# Patient Record
Sex: MALE | Race: WHITE | NOT HISPANIC OR LATINO | Employment: OTHER | URBAN - NONMETROPOLITAN AREA
[De-identification: names, ages, dates, MRNs, and addresses within clinical notes are randomized per-mention and may not be internally consistent; named-entity substitution may affect disease eponyms.]

---

## 2017-10-11 ENCOUNTER — TRANSFERRED RECORDS (OUTPATIENT)
Dept: HEALTH INFORMATION MANAGEMENT | Facility: HOSPITAL | Age: 77
End: 2017-10-11

## 2017-10-12 ENCOUNTER — HOSPITAL ENCOUNTER (OUTPATIENT)
Dept: CT IMAGING | Facility: HOSPITAL | Age: 77
Discharge: HOME | End: 2017-10-12
Attending: FAMILY MEDICINE | Admitting: FAMILY MEDICINE
Payer: COMMERCIAL

## 2017-10-12 DIAGNOSIS — R10.9 ABDOMINAL PAIN: ICD-10-CM

## 2017-10-12 DIAGNOSIS — R31.9 HEMATURIA: ICD-10-CM

## 2017-10-12 PROCEDURE — 74178 CT ABD&PLV WO CNTR FLWD CNTR: CPT | Mod: TC

## 2017-10-12 PROCEDURE — 25000128 H RX IP 250 OP 636: Performed by: RADIOLOGY

## 2017-10-12 RX ORDER — IOPAMIDOL 612 MG/ML
150 INJECTION, SOLUTION INTRAVASCULAR ONCE
Status: COMPLETED | OUTPATIENT
Start: 2017-10-12 | End: 2017-10-12

## 2017-10-12 RX ADMIN — IOPAMIDOL 130 ML: 612 INJECTION, SOLUTION INTRAVENOUS at 14:29

## 2019-05-09 ENCOUNTER — HOSPITAL ENCOUNTER (EMERGENCY)
Facility: HOSPITAL | Age: 79
Discharge: HOME | End: 2019-05-09
Attending: EMERGENCY MEDICINE | Admitting: EMERGENCY MEDICINE
Payer: COMMERCIAL

## 2019-05-09 VITALS
WEIGHT: 145 LBS | HEIGHT: 69 IN | DIASTOLIC BLOOD PRESSURE: 84 MMHG | OXYGEN SATURATION: 98 % | BODY MASS INDEX: 21.48 KG/M2 | HEART RATE: 135 BPM | TEMPERATURE: 97.4 F | RESPIRATION RATE: 16 BRPM | SYSTOLIC BLOOD PRESSURE: 113 MMHG

## 2019-05-09 DIAGNOSIS — I48.91 ATRIAL FIBRILLATION WITH RVR (H): Primary | ICD-10-CM

## 2019-05-09 LAB
ALBUMIN SERPL-MCNC: 3.9 G/DL (ref 3.4–5)
ALP SERPL-CCNC: 75 U/L (ref 40–150)
ALT SERPL W P-5'-P-CCNC: 30 U/L (ref 0–70)
ANION GAP SERPL CALCULATED.3IONS-SCNC: 6 MMOL/L (ref 3–14)
AST SERPL W P-5'-P-CCNC: 36 U/L (ref 0–45)
BASOPHILS # BLD AUTO: 0 10E9/L (ref 0–0.2)
BASOPHILS NFR BLD AUTO: 0.5 %
BILIRUB SERPL-MCNC: 0.8 MG/DL (ref 0.2–1.3)
BUN SERPL-MCNC: 21 MG/DL (ref 7–30)
CALCIUM SERPL-MCNC: 9.2 MG/DL (ref 8.5–10.1)
CHLORIDE SERPL-SCNC: 108 MMOL/L (ref 94–109)
CO2 SERPL-SCNC: 27 MMOL/L (ref 20–32)
CREAT SERPL-MCNC: 0.74 MG/DL (ref 0.66–1.25)
D DIMER PPP DDU-MCNC: 210 NG/ML D-DU (ref 0–300)
DIFFERENTIAL METHOD BLD: NORMAL
EOSINOPHIL # BLD AUTO: 0.1 10E9/L (ref 0–0.7)
EOSINOPHIL NFR BLD AUTO: 1.8 %
ERYTHROCYTE [DISTWIDTH] IN BLOOD BY AUTOMATED COUNT: 14.8 % (ref 10–15)
GFR SERPL CREATININE-BSD FRML MDRD: 89 ML/MIN/{1.73_M2}
GLUCOSE SERPL-MCNC: 94 MG/DL (ref 70–99)
HCT VFR BLD AUTO: 46.1 % (ref 40–53)
HGB BLD-MCNC: 15.5 G/DL (ref 13.3–17.7)
IMM GRANULOCYTES # BLD: 0 10E9/L (ref 0–0.4)
IMM GRANULOCYTES NFR BLD: 0.4 %
LIPASE SERPL-CCNC: 105 U/L (ref 73–393)
LYMPHOCYTES # BLD AUTO: 1.3 10E9/L (ref 0.8–5.3)
LYMPHOCYTES NFR BLD AUTO: 22.3 %
MAGNESIUM SERPL-MCNC: 2.4 MG/DL (ref 1.6–2.3)
MCH RBC QN AUTO: 30.2 PG (ref 26.5–33)
MCHC RBC AUTO-ENTMCNC: 33.6 G/DL (ref 31.5–36.5)
MCV RBC AUTO: 90 FL (ref 78–100)
MONOCYTES # BLD AUTO: 0.7 10E9/L (ref 0–1.3)
MONOCYTES NFR BLD AUTO: 13 %
NEUTROPHILS # BLD AUTO: 3.5 10E9/L (ref 1.6–8.3)
NEUTROPHILS NFR BLD AUTO: 62 %
NRBC # BLD AUTO: 0 10*3/UL
NRBC BLD AUTO-RTO: 0 /100
PLATELET # BLD AUTO: 227 10E9/L (ref 150–450)
POTASSIUM SERPL-SCNC: 4.3 MMOL/L (ref 3.4–5.3)
PROT SERPL-MCNC: 7.7 G/DL (ref 6.8–8.8)
RBC # BLD AUTO: 5.14 10E12/L (ref 4.4–5.9)
SODIUM SERPL-SCNC: 141 MMOL/L (ref 133–144)
TROPONIN I SERPL-MCNC: <0.015 UG/L (ref 0–0.04)
WBC # BLD AUTO: 5.7 10E9/L (ref 4–11)

## 2019-05-09 PROCEDURE — 99285 EMERGENCY DEPT VISIT HI MDM: CPT | Mod: Z6 | Performed by: EMERGENCY MEDICINE

## 2019-05-09 PROCEDURE — 83690 ASSAY OF LIPASE: CPT | Performed by: EMERGENCY MEDICINE

## 2019-05-09 PROCEDURE — 99285 EMERGENCY DEPT VISIT HI MDM: CPT | Mod: 25

## 2019-05-09 PROCEDURE — 93005 ELECTROCARDIOGRAM TRACING: CPT

## 2019-05-09 PROCEDURE — 80053 COMPREHEN METABOLIC PANEL: CPT | Performed by: EMERGENCY MEDICINE

## 2019-05-09 PROCEDURE — 25000132 ZZH RX MED GY IP 250 OP 250 PS 637: Performed by: EMERGENCY MEDICINE

## 2019-05-09 PROCEDURE — 96361 HYDRATE IV INFUSION ADD-ON: CPT

## 2019-05-09 PROCEDURE — 83735 ASSAY OF MAGNESIUM: CPT | Performed by: EMERGENCY MEDICINE

## 2019-05-09 PROCEDURE — 25000128 H RX IP 250 OP 636: Performed by: EMERGENCY MEDICINE

## 2019-05-09 PROCEDURE — 85025 COMPLETE CBC W/AUTO DIFF WBC: CPT | Performed by: EMERGENCY MEDICINE

## 2019-05-09 PROCEDURE — 96374 THER/PROPH/DIAG INJ IV PUSH: CPT

## 2019-05-09 PROCEDURE — 84484 ASSAY OF TROPONIN QUANT: CPT | Performed by: EMERGENCY MEDICINE

## 2019-05-09 PROCEDURE — 85379 FIBRIN DEGRADATION QUANT: CPT | Performed by: EMERGENCY MEDICINE

## 2019-05-09 PROCEDURE — 36415 COLL VENOUS BLD VENIPUNCTURE: CPT | Performed by: EMERGENCY MEDICINE

## 2019-05-09 RX ORDER — SODIUM CHLORIDE 9 MG/ML
1000 INJECTION, SOLUTION INTRAVENOUS CONTINUOUS
Status: DISCONTINUED | OUTPATIENT
Start: 2019-05-09 | End: 2019-05-10 | Stop reason: HOSPADM

## 2019-05-09 RX ORDER — LABETALOL 20 MG/4 ML (5 MG/ML) INTRAVENOUS SYRINGE
20 ONCE
Status: COMPLETED | OUTPATIENT
Start: 2019-05-09 | End: 2019-05-09

## 2019-05-09 RX ORDER — DILTIAZEM HYDROCHLORIDE 60 MG/1
60 CAPSULE, EXTENDED RELEASE ORAL 2 TIMES DAILY
Qty: 60 CAPSULE | Refills: 0 | Status: ON HOLD | OUTPATIENT
Start: 2019-05-09 | End: 2024-09-24

## 2019-05-09 RX ORDER — ASPIRIN 81 MG/1
324 TABLET, CHEWABLE ORAL ONCE
Status: COMPLETED | OUTPATIENT
Start: 2019-05-09 | End: 2019-05-09

## 2019-05-09 RX ADMIN — LABETALOL 20 MG/4 ML (5 MG/ML) INTRAVENOUS SYRINGE 20 MG: at 21:46

## 2019-05-09 RX ADMIN — SODIUM CHLORIDE 1000 ML: 9 INJECTION, SOLUTION INTRAVENOUS at 21:44

## 2019-05-09 RX ADMIN — ASPIRIN 81 MG 324 MG: 81 TABLET ORAL at 21:45

## 2019-05-09 ASSESSMENT — MIFFLIN-ST. JEOR: SCORE: 1378.1

## 2019-05-09 ASSESSMENT — ENCOUNTER SYMPTOMS
ABDOMINAL PAIN: 0
FEVER: 0
SHORTNESS OF BREATH: 0

## 2019-05-09 NOTE — ED AVS SNAPSHOT
[facility]  [address] [place] [id number]  Phone:  [phone]                                    Kem Abdullahi   MRN: [id number]    Department:  [facility]   Date of Visit:  5/9/2019           After Visit Summary Signature Page    I have received my discharge instructions, and my questions have been answered. I have discussed any challenges I see with this plan with the nurse or doctor.    ..........................................................................................................................................  Patient/Patient Representative Signature      ..........................................................................................................................................  Patient Representative Print Name and Relationship to Patient    ..................................................               ................................................  Date                                   Time    ..........................................................................................................................................  Reviewed by Signature/Title    ...................................................              ..............................................  Date                                               Time          22EPIC Rev 08/18

## 2019-05-10 NOTE — ED NOTES
"Pt states today around 1600 he was having \"heartburn\" and decided to check his BP. States his BP machine \"was all over the place\" so he went to hisdaughters and she noted he had an irregular pulse so brought pt to ER. Pt denies any pain at this time stating \"I burped and the heartburn went away\". Denies any SOB or dizziness.  "

## 2019-05-10 NOTE — ED PROVIDER NOTES
History     Chief Complaint   Patient presents with     Heartburn     One episode at home with C/O irregular heart beat     HPI  Kem Abdullahi is a 78 year old male who presented to the emergency department accompanied by the daughter.  He started having an irregular heartbeat since 4 PM today.  He also developed a heartburn that has since cleared after burping.  He had a couple of stents placed more than 10 years ago and is currently not taking any medications apart from multivitamins.  He denies any shortness of breath, dizziness, chest pain, vomiting, orthopnea, paroxysmal dyspnea.  Patient blood pressure is noted to be elevated upon arrival at the ED at 169/121 mmHg.    Allergies:  No Known Allergies    Problem List:    There are no active problems to display for this patient.       Past Medical History:    Past Medical History:   Diagnosis Date     Coronary artery disease      ED (erectile dysfunction)      Hyperlipidemia      Hypertension      Stented coronary artery 2006       Past Surgical History:    Past Surgical History:   Procedure Laterality Date     BACK SURGERY  2009    lower back     COLONOSCOPY       HERNIA REPAIR       PHACOEMULSIFICATION WITH STANDARD INTRAOCULAR LENS IMPLANT Left 5/23/2016    Procedure: PHACOEMULSIFICATION WITH STANDARD INTRAOCULAR LENS IMPLANT;  Surgeon: Bi Wayne MD;  Location: [facility]       Family History:    No family history on file.    Social History:  Marital Status:  Single [1]  Social History     Tobacco Use     Smoking status: Former Smoker   Substance Use Topics     Alcohol use: Not on file     Drug use: Not on file        Medications:      ASPIRIN PO   diltiazem ER (CARDIZEM SR) 60 MG 12 hr capsule   ezetimibe-simvastatin (VYTORIN) 10-40 MG tablet   rivaroxaban ANTICOAGULANT (XARELTO) 20 MG TABS tablet   Tadalafil (CIALIS PO)   zinc 50 MG TABS         Review of Systems   Constitutional: Negative for fever.   Respiratory: Negative for shortness of breath.   "  Cardiovascular: Negative for chest pain.   Gastrointestinal: Negative for abdominal pain.   All other systems reviewed and are negative.      Physical Exam   BP: (!) 169/121  Pulse: (!) 135  Temp: 97.3  F (36.3  C)  Resp: 16  Height: 175.3 cm (5' 9\")  Weight: 65.8 kg (145 lb)  SpO2: 96 %      Physical Exam   Constitutional: He is oriented to person, place, and time. He appears well-developed and well-nourished. No distress.   HENT:   Head: Normocephalic and atraumatic.   Eyes: Pupils are equal, round, and reactive to light. EOM are normal.   Cardiovascular: Normal heart sounds and intact distal pulses. An irregularly irregular rhythm present. Tachycardia present.   Pulmonary/Chest: Effort normal and breath sounds normal. No stridor. No respiratory distress.   Abdominal: Soft. Bowel sounds are normal. He exhibits no distension. There is no tenderness.   Musculoskeletal: He exhibits no edema, tenderness or deformity.   Neurological: He is alert and oriented to person, place, and time. No cranial nerve deficit.   Skin: He is not diaphoretic.   Nursing note and vitals reviewed.      ED Course   Evaluated and laboratory tests ordered.  Started on IV fluid hydration.  IV Cardizem given for A. fib with RVR.       Procedures  EKG: A. fib with RVR at 121 bpm       Results for orders placed or performed during the hospital encounter of 05/09/19 (from the past 24 hour(s))   CBC with platelets differential   Result Value Ref Range    WBC 5.7 4.0 - 11.0 10e9/L    RBC Count 5.14 4.4 - 5.9 10e12/L    Hemoglobin 15.5 13.3 - 17.7 g/dL    Hematocrit 46.1 40.0 - 53.0 %    MCV 90 78 - 100 fl    MCH 30.2 26.5 - 33.0 pg    MCHC 33.6 31.5 - 36.5 g/dL    RDW 14.8 10.0 - 15.0 %    Platelet Count 227 150 - 450 10e9/L    Diff Method Automated Method     % Neutrophils 62.0 %    % Lymphocytes 22.3 %    % Monocytes 13.0 %    % Eosinophils 1.8 %    % Basophils 0.5 %    % Immature Granulocytes 0.4 %    Nucleated RBCs 0 0 /100    Absolute " Neutrophil 3.5 1.6 - 8.3 10e9/L    Absolute Lymphocytes 1.3 0.8 - 5.3 10e9/L    Absolute Monocytes 0.7 0.0 - 1.3 10e9/L    Absolute Eosinophils 0.1 0.0 - 0.7 10e9/L    Absolute Basophils 0.0 0.0 - 0.2 10e9/L    Abs Immature Granulocytes 0.0 0 - 0.4 10e9/L    Absolute Nucleated RBC 0.0    Troponin I   Result Value Ref Range    Troponin I ES <0.015 0.000 - 0.045 ug/L   Comprehensive metabolic panel   Result Value Ref Range    Sodium 141 133 - 144 mmol/L    Potassium 4.3 3.4 - 5.3 mmol/L    Chloride 108 94 - 109 mmol/L    Carbon Dioxide 27 20 - 32 mmol/L    Anion Gap 6 3 - 14 mmol/L    Glucose 94 70 - 99 mg/dL    Urea Nitrogen 21 7 - 30 mg/dL    Creatinine 0.74 0.66 - 1.25 mg/dL    GFR Estimate 89 >60 mL/min/[1.73_m2]    GFR Estimate If Black >90 >60 mL/min/[1.73_m2]    Calcium 9.2 8.5 - 10.1 mg/dL    Bilirubin Total 0.8 0.2 - 1.3 mg/dL    Albumin 3.9 3.4 - 5.0 g/dL    Protein Total 7.7 6.8 - 8.8 g/dL    Alkaline Phosphatase 75 40 - 150 U/L    ALT 30 0 - 70 U/L    AST 36 0 - 45 U/L   Magnesium   Result Value Ref Range    Magnesium 2.4 (H) 1.6 - 2.3 mg/dL   Lipase   Result Value Ref Range    Lipase 105 73 - 393 U/L   D-Dimer ([facility])   Result Value Ref Range    D-Dimer ng/mL 210 0 - 300 ng/ml D-DU       Medications   0.9% sodium chloride BOLUS (1,000 mLs Intravenous New Bag 5/9/19 2144)     Followed by   sodium chloride 0.9% infusion (has no administration in time range)   aspirin (ASA) chewable tablet 324 mg (324 mg Oral Given 5/9/19 2145)   labetalol (NORMODYNE/TRANDATE) syringe 20 mg (20 mg Intravenous Given 5/9/19 2146)       Assessments & Plan (with Medical Decision Making)   Atrial fibrillation with RVR: Responded well to a single dose of Cardizem 20 mg IV.  Blood pressure normalized on the heart rate reduced from 135 to 90 bpm.  Normal CBC, CMP, troponin, d-dimer, magnesium and lipase.  Patient started on Xarelto and Cardizem.  Advised follow-up with PCP tomorrow or latest Monday.  Echocardiogram was ordered as  outpatient.  Results will be discussed with PCP.  Will discharge home on May return to ED if his condition worsens.  Written and verbal discharge instructions given for new onset atrial fibrillation and its management and clinical course.  These were also discussed with the daughter who accompanied patient to the room.  Discharged home in stable condition.    I have reviewed the nursing notes.    I have reviewed the findings, diagnosis, plan and need for follow up with the patient.       Medication List      Started    diltiazem ER 60 MG 12 hr capsule  Commonly known as:  CARDIZEM SR  60 mg, Oral, 2 TIMES DAILY     rivaroxaban ANTICOAGULANT 20 MG Tabs tablet  Commonly known as:  XARELTO  20 mg, Oral, DAILY WITH SUPPER            Final diagnoses:   Atrial fibrillation with RVR ([facility])       5/9/2019   [facility]     Bartolo Barrios MD  05/09/19 0798

## 2020-06-10 ENCOUNTER — MEDICAL CORRESPONDENCE (OUTPATIENT)
Dept: NUCLEAR MEDICINE | Facility: HOSPITAL | Age: 80
End: 2020-06-10

## 2020-06-17 ENCOUNTER — TELEPHONE (OUTPATIENT)
Dept: NUCLEAR MEDICINE | Facility: HOSPITAL | Age: 80
End: 2020-06-17

## 2020-06-17 NOTE — TELEPHONE ENCOUNTER
Spoke with Kem to see if he is available to move his HIDA exam from 6-23-20 to today at 1230 due to a cancellation.    Patient was able to but unfortunately I am not able to move him into today's spot due to not being NPO.    This is not the patient's fault, as he was not scheduled for today.

## 2020-06-23 ENCOUNTER — HOSPITAL ENCOUNTER (OUTPATIENT)
Dept: NUCLEAR MEDICINE | Facility: HOSPITAL | Age: 80
End: 2020-06-23
Attending: INTERNAL MEDICINE
Payer: COMMERCIAL

## 2020-06-23 DIAGNOSIS — R11.10 VOMITING, UNSPECIFIED: ICD-10-CM

## 2020-06-23 PROCEDURE — 78227 HEPATOBIL SYST IMAGE W/DRUG: CPT | Mod: TC

## 2020-06-23 PROCEDURE — 34300033 ZZH RX 343: Performed by: RADIOLOGY

## 2020-06-23 PROCEDURE — A9537 TC99M MEBROFENIN: HCPCS | Performed by: RADIOLOGY

## 2020-06-23 RX ORDER — KIT FOR THE PREPARATION OF TECHNETIUM TC 99M MEBROFENIN 45 MG/10ML
5.2 INJECTION, POWDER, LYOPHILIZED, FOR SOLUTION INTRAVENOUS ONCE
Status: COMPLETED | OUTPATIENT
Start: 2020-06-23 | End: 2020-06-23

## 2020-06-23 RX ADMIN — MEBROFENIN 5.2 MILLICURIE: 45 INJECTION, POWDER, LYOPHILIZED, FOR SOLUTION INTRAVENOUS at 12:57

## 2020-06-23 NOTE — PROCEDURES
16 oz vanilla boost, 28 grams of fat, was orally administered for Nuclear Medicine HIDA with EF exam.

## 2021-07-12 ENCOUNTER — TELEPHONE (OUTPATIENT)
Dept: CARDIAC REHAB | Facility: HOSPITAL | Age: 81
End: 2021-07-12

## 2021-07-12 NOTE — TELEPHONE ENCOUNTER
Patient was called to schedule Phase II Cardiac Rehab. Message was left for him to call [phone].  Isaura Bautista, RT on 7/12/2021 at 2:19 PM

## 2021-07-12 NOTE — TELEPHONE ENCOUNTER
Patient returned call stating he would like to wait until the 26th for a check-in regarding Phase II cardiac rehab as he has not yet had a staged intervention.   Isaura Bautista RT on 7/12/2021 at 2:37 PM

## 2021-07-30 ENCOUNTER — TELEPHONE (OUTPATIENT)
Dept: CARDIAC REHAB | Facility: HOSPITAL | Age: 81
End: 2021-07-30

## 2021-07-30 NOTE — TELEPHONE ENCOUNTER
Patient was called to set up initial evaluation for Phase II Cardiac Rehab. He states he has yet to have his intervention. He does not have anything scheduled at this time. Patient was given call back phone number to call us after he has had his intervention.   RT Tommy on 7/30/2021 at 10:52 AM

## 2021-08-20 ENCOUNTER — OFFICE VISIT (OUTPATIENT)
Dept: FAMILY MEDICINE | Facility: OTHER | Age: 81
End: 2021-08-20
Attending: NURSE PRACTITIONER
Payer: COMMERCIAL

## 2021-08-20 VITALS
HEART RATE: 56 BPM | DIASTOLIC BLOOD PRESSURE: 94 MMHG | TEMPERATURE: 96.9 F | SYSTOLIC BLOOD PRESSURE: 152 MMHG | RESPIRATION RATE: 12 BRPM | BODY MASS INDEX: 21.42 KG/M2 | HEIGHT: 69 IN | WEIGHT: 144.6 LBS

## 2021-08-20 DIAGNOSIS — R35.0 URINARY FREQUENCY: ICD-10-CM

## 2021-08-20 DIAGNOSIS — R81 GLUCOSE FOUND IN URINE ON EXAMINATION: Primary | ICD-10-CM

## 2021-08-20 LAB
ALBUMIN UR-MCNC: NEGATIVE MG/DL
APPEARANCE UR: CLEAR
BILIRUB UR QL STRIP: NEGATIVE
COLOR UR AUTO: ABNORMAL
GLUCOSE UR STRIP-MCNC: 200 MG/DL
HGB UR QL STRIP: NEGATIVE
KETONES UR STRIP-MCNC: NEGATIVE MG/DL
LEUKOCYTE ESTERASE UR QL STRIP: NEGATIVE
NITRATE UR QL: NEGATIVE
PH UR STRIP: 6.5 [PH] (ref 5–9)
SP GR UR STRIP: 1.01 (ref 1–1.03)
UROBILINOGEN UR STRIP-MCNC: NORMAL MG/DL

## 2021-08-20 PROCEDURE — 99203 OFFICE O/P NEW LOW 30 MIN: CPT | Performed by: NURSE PRACTITIONER

## 2021-08-20 PROCEDURE — 81003 URINALYSIS AUTO W/O SCOPE: CPT | Mod: ZL | Performed by: NURSE PRACTITIONER

## 2021-08-20 PROCEDURE — G0463 HOSPITAL OUTPT CLINIC VISIT: HCPCS

## 2021-08-20 RX ORDER — SULFAMETHOXAZOLE/TRIMETHOPRIM 800-160 MG
1 TABLET ORAL 2 TIMES DAILY
Qty: 14 TABLET | Refills: 0 | Status: CANCELLED | OUTPATIENT
Start: 2021-08-20 | End: 2021-08-27

## 2021-08-20 ASSESSMENT — PAIN SCALES - GENERAL: PAINLEVEL: NO PAIN (0)

## 2021-08-20 ASSESSMENT — MIFFLIN-ST. JEOR: SCORE: 1366.28

## 2021-08-20 NOTE — PROGRESS NOTES
ASSESSMENT/PLAN:    I have reviewed the nursing notes.  I have reviewed the findings, diagnosis, plan and need for follow up with the patient.    1. Urinary frequency  2. Glucose found in urine on examination  Alert is afebrile, normal vital signs.  Physical examination without abnormal findings.  - UA reflex to Microscopic and Culture  -Urine showed negative nitrates, leukocyte esterase, bacteria today.  There was 200 of glucose found on urinalysis.  I discussed this at length with patient and recommended we draw an A1c and BMP today.  He doctors at [facility] in [place], [place] and I am unable to see records from that.  States he is certain he had an A1c done in the last 2 months that was normal.  He declines having A1c and BMP drawn today but does agree to follow-up next week with Dr. Rodriguez (his PCP) to have this further evaluated.  I recommended that he follow up urgently or emergently if he develops any new or worsening symptoms prior to that.   Certainly follow-up sooner if urinary symptoms worsen or if he develops a fever with chills.    Discussed warning signs/symptoms indicative of need to f/u    Follow up if symptoms persist or worsen or concerns    I explained my diagnostic considerations and recommendations to the patient, who voiced understanding and agreement with the treatment plan. All questions were answered. We discussed potential side effects of any prescribed or recommended therapies, as well as expectations for response to treatments.    Paola Vergara NP  8/20/2021  11:10 AM    HPI:  Kem Abdullahi is a 80 year old male who presents to [facility] today for concerns of burning with urination and slow urinary stream. Reports history of kidney stones about 5 years ago, currently no flank pain or symptoms consistent with that. Urinary frequency and urgency.  No hematuria. No fever/chills. Symptoms started about 3 days ago. Symptoms staying about the same in severity. Drinking lots of water. No  "history of urinary tract infection in the past. No nausea, vomiting, diarrhea.  Denies concern for STDs.  Denies penile discharge.      Past Medical History:   Diagnosis Date     Coronary artery disease      ED (erectile dysfunction)      Hyperlipidemia      Hypertension      Stented coronary artery 2006     Past Surgical History:   Procedure Laterality Date     BACK SURGERY  2009    lower back     COLONOSCOPY       HERNIA REPAIR       PHACOEMULSIFICATION WITH STANDARD INTRAOCULAR LENS IMPLANT Left 5/23/2016    Procedure: PHACOEMULSIFICATION WITH STANDARD INTRAOCULAR LENS IMPLANT;  Surgeon: Bi Wayne MD;  Location: [facility]     Social History     Tobacco Use     Smoking status: Former Smoker     Smokeless tobacco: Never Used   Substance Use Topics     Alcohol use: Yes     Comment: occassionally     Current Outpatient Medications   Medication Sig Dispense Refill     ASPIRIN PO Take 650 mg by mouth daily        diltiazem ER (CARDIZEM SR) 60 MG 12 hr capsule Take 1 capsule (60 mg) by mouth 2 times daily 60 capsule 0     ezetimibe-simvastatin (VYTORIN) 10-40 MG tablet Take 1 tablet by mouth At Bedtime        Tadalafil (CIALIS PO) Take 10 mg by mouth daily as needed for erectile dysfunction       zinc 50 MG TABS Take 1 tablet by mouth daily       No Known Allergies  Past medical history, past surgical history, current medications and allergies reviewed and accurate to the best of my knowledge.      ROS:  Refer to HPI    BP (!) 152/94 (BP Location: Left arm, Patient Position: Sitting, Cuff Size: Adult Regular)   Pulse 56   Temp 96.9  F (36.1  C) (Tympanic)   Resp 12   Ht 1.753 m (5' 9\")   Wt 65.6 kg (144 lb 9.6 oz)   BMI 21.35 kg/m      EXAM:  General Appearance: Well appearing 80 year old male, appropriate appearance for age. No acute distress  Respiratory: normal chest wall and respirations.  Normal effort.  Clear to auscultation bilaterally, no wheezing, crackles or rhonchi.  No increased work of breathing.  " No cough appreciated.  Cardiac: RRR with no murmurs  Abdomen: soft, nontender, no rigidity, no rebound tenderness or guarding, normal bowel sounds present  :  No suprapubic tenderness to palpation.  No CVA tenderness to palpation.    Musculoskeletal:  Equal movement of bilateral upper extremities.  Equal movement of bilateral lower extremities.  Normal gait.    Psychological: normal affect, alert, oriented, and pleasant.

## 2021-08-20 NOTE — PATIENT INSTRUCTIONS
We discussed your urine showing no signs of infection today, but it did show 200 of glucose in the urine.   With this being said, we discussed drawing A1C today and kidney function labs. I cannot see your lab work from [facility] (Dr. Rodriguez).    You declined this blood work today, but agree to follow up with Dr. Rodriguez as soon as possible to evaluate this concern soon.     Follow up emergently if you develop any worsening symptoms or concerns.

## 2021-09-24 ENCOUNTER — HOSPITAL ENCOUNTER (OUTPATIENT)
Dept: CARDIAC REHAB | Facility: HOSPITAL | Age: 81
Setting detail: THERAPIES SERIES
End: 2021-09-24
Attending: INTERNAL MEDICINE
Payer: COMMERCIAL

## 2021-09-24 VITALS — BODY MASS INDEX: 21.33 KG/M2 | WEIGHT: 144 LBS | HEIGHT: 69 IN

## 2021-09-24 DIAGNOSIS — Z98.61 POSTSURGICAL PERCUTANEOUS TRANSLUMINAL CORONARY ANGIOPLASTY STATUS: ICD-10-CM

## 2021-09-24 PROCEDURE — 999N000109 HC STATISTIC OP CR VISIT

## 2021-09-24 PROCEDURE — 93798 PHYS/QHP OP CAR RHAB W/ECG: CPT

## 2021-09-24 ASSESSMENT — 6 MINUTE WALK TEST (6MWT)
GENDER SELECTION: MALE
MALE CALC: 1675.94
TOTAL DISTANCE WALKED (FT): 1400
PREDICTED: 1686.16
FEMALE CALC: 1430.3

## 2021-09-24 ASSESSMENT — MIFFLIN-ST. JEOR: SCORE: 1363.56

## 2021-09-24 NOTE — PROGRESS NOTES
09/24/21 1000   Session   Session Initial Evaluation and Exercise Prescription   Certified through this date 10/23/21   Cardiac Rehab Assessment   Cardiac Rehab Assessment 9/24: Pt has been initiated into Phase II rehab following TAMERA x 2 at [facility]. Pt has a known cardiac history that reaches to the early 2000s, but has been not been experiencing angina symptoms since previous stenting intervention. 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 Pt states they were experiencing on and off chest pain over the course of the last year which would resolve 5-10 minutes with rest. Staff is unaware if pt revealed this information to MD when they were discussing aviation appointment. Week later pt had a follow up with Dr. Ceballos and pt disclosed recent SOB with exertion. Cardiology decided it would be beneficial for pt to complete additional cardiac assessments due to extended time of intervention. During the established appointment, it was determined that PCI would be performed to allow perfusion to distal RCA vessel. Unfortunately, cardiology was unable to stent the occlusion and referred case to [facility] for  angiogram.    The patient's history and clinical status including hemodynamics and ECG were evaluated. The patient was assessed to be stable and appropriate to begin exercise.   The patient's functional capacity and exercise prescription were determined by the completion of the 6 minute walk test. See results above. The patient was oriented to the program.  Risk factor profile was completed. Goals and objectives were discussed. CV response was WNL. No symptoms, complaints or pain were reported. Good prognosis for reaching goals below. Skilled therapy is necessary in order to monitor CV response to exercise, to provide education on risk factors and  "behavior change counseling needed to achieve patient's goals.  Plan to progress to 30-40 minutes of exercise prior to discharge from cardiac rehab.  Initial THR of 20-30 beats above RHR; Effort rating of 4-6. Initiate muscle conditioning as appropriate. Provide risk factor education and behavior change counseling.    General Information   Treatment Diagnosis Stent  (TAMERA x 2)   Date of Treatment Diagnosis 08/30/21   Significant Past CV History Previous MI;Previous PCI   Comorbidities Previous MI   Other Medical History 9/24: No other medical history noted.   Lead up symptoms 9/24: Over the last year pt was experiencing episodes of chest pain that became progressively worse over the summer. 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 Pt soon had a follow up with cardiology and discussed their episodes of SOB with exertion and pt was set up with cardiology appointments at [facility]. Following procedure, pt had a planned PCI for perfusion to  of RCA. Unfortunately, interventionalist was unable to place stent and pt was referred to [facility] for stenting.   Hospital Location [facility]   Hospital Discharge Date 09/01/21   Signs and Symptoms Post Hospital Discharge SOB   Comments 9/24: Pt discussed with staff that they still experience SOB and this could be result of the \"weak heart\" MD's mentioned in the their cardiac consultation. Rehab discussed EF and the meaning of lower percentages, which could be a contributing factor to their SOB experience.   Outpatient Cardiac Rehab Start Date 09/24/21   Primary Physician Dr. Rodriguez   Primary Physician Follow Up Completed   Specialist Dr. Hood   Specialist Follow Up Other  (Unknown - [facility])   Cardiologist Dr. Ceballos   Cardiologist Follow Up Scheduled  (Oct 19 - [facility])   Ejection Fraction 35-40%   Risk Stratification Moderate   Summary " "of Cath Report   Summary of Cath Report No information available   Living and Work Status    Living Arrangements and Social Status house;significant other   Support System Live with an adult;Check-in help as needed   Return to Employment Yes   Occupation    Preventative Medications   CMS recommended medications Beta Blocker;Antiplatelets;Lipid Lowering   Fall Risk Screen   Fall screen completed by Cardiac Rehab   Have you fallen 2 or more times in the past year? No   Have you fallen and had an injury in the past year? No   Timed Up and Go score (seconds) NA   Is patient a fall risk? No   Fall screen comments 9/24: Pt is not at risk for falls and will not need a referral for PT,    Abuse Screen (yes response referral indicated)   Feels Unsafe at Home or Work/School no   Feels Threatened by Someone no   Does Anyone Try to Keep You From Having Contact with Others or Doing Things Outside Your Home? no   Physical Signs of Abuse Present no   Pain   Patient Currently in Pain No   Physical Assessments   Incisions WNL   Edema None   Right Lung Sounds not assessed   Left Lung Sounds not assessed   Limitations Other (see comments)  (SOB with exertion)   Comments 9/24: Pt has returned to work and has started using their chainsaw 5 days after their stenting procedure. Pt states they experience SOB with exertion but otherwise can handle the tasks without much difficulty.    Individualized Treatment Plan   Monitored Sessions Scheduled 25   Monitored Sessions Attended 1   Oxygen   Supplemental Oxygen needed No   Nutrition Management - Weight Management   Assessment Initial Assessment   Age 80   Weight 65.3 kg (144 lb)   Height 1.753 m (5' 9\")   BMI (Calculated) 21.26   Initial Rate Your Plate Score. Dietary tool to assess eating patterns. Scores range from 24 to 72. The higher the score the healthier the eating pattern. 44   Weight Management Comments 9/24: Pt denies the need for intervention at this time.  "   Nutrition Management - Lipids   Lipids Labs Available   Date 08/30/21   Total Cholesterol 162   Triglycerides 168   HDL 52   LDL 76   Prescribed Lipid Medication Yes   Statin Intensity High Intensity   Lipid Comments 9/24: Pt adheres to medications as prescribed by MD.    Nutrition Management - Diabetes   Diabetes No   Nutrition Management Summary   Dietary Recommendations Low Fat;Low Cholesterol;Low Sodium   Stages of Change for Diet Compliance Preparation   Interventions Planned Instruct on Label Reading   Nutrition Summary Comments 9/24: Pt will be provided educational handout pertaining to personal RF, including dietary changes that align with heart health.    Nutrition Target Outcome BMI < 25   Psychosocial Management   Psychosocial Assessment Initial   Is there history of clinical depression or increased risk of depression? No previous history   Current Level of Stress per Patient Report Denies   Current Coping Skills Has Positive Support System   Initial Patient Health Questionnaire -9 Score (PHQ-9) for depression. 5-9 Minimal symptoms, 10-14 Minor depression, 15-19 Major depression, moderately severe, > 20 Major depression, severe  0   Initial [facility] Survey score.  Quality of Life:   If total score > 25 review individual areas where patient rated a 4 or 5.  Consider patients current medical condition and what role that plays on the score.   Adjust treatment protocol to improve areas of concern.  Consider the following:  PHQ9 score, DASI, and re-assessment within the next 30 days to assist with developing treatments.  9   Stages of Change Action   Interventions Planned Patient to verbalize understanding of behavioral assessment results   Patient Goal No   Psychosocial Comments 9/24: Pt does not need additional assessments pertaining to psychosocial needs. Staff will monitor and assess as needed.    Psychosocial Target Outcome Identify absence or presence of depression using valid screening tool  "  Other Core Components - Hypertension   History of or Diagnosis of Hypertension No   Currently taking Anti-Hypertensives Yes;Beta blocker   Hypertension Comments 9/24: Pt is taking medications as prescribed but cardiac staff will monitor blood pressure values while in attendance. Initial BP readings are higher than desired.    Other Core Components - Tobacco   History of Tobacco Use Yes   Quit Date or Planned Quit Date 01/01/80   Tobacco Use Status Former (Quit > 6 mo ago)   Tobacco Habit Cigarettes   Tobacco Use per Day (average) 1   Years of Tobacco Use 20   Stages of Change Maintenance   Tobacco Comments 9/24: Pt does not smoke cigarettes but will have an occasional cigar during special occasions. Pt was told by former cardiologist to \"continue what you are doing\" and pt lives by that motto.    Other Core Components Summary   Interventions Planned Instruct patient on the DASH diet   Patient Goals No   Other Core Components Comments 9/24: Pt does not need additional assessment for core components at this time.   Other Core Components Target Outcome BP < 140/90 or < 130/80 with DM or CKD   Activity/Exercise History   Activity/Exercise Assessment Initial   Activity/Exercise Status prior to event? Was Physically Active   Number of Days Currently participating in Moderate Physical Activity? 4   Number of Days Currently performing  Aerobic Exercise (including rehab)? 0   Number of Minutes per Session Currently of Aerobic Exercise (average)? 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 Pt does not want to attend the whole 25 sessions but would consider a short stay.    Activity/Exercise Target Outcome An Accumulation of 150  Minutes of Aerobic Activity per Week   Exercise " Assessment   6 Minute Walk Predicted - Gender Selection Male   6 Minute Walk Predicted (Male) 1675.94   6 Minute Walk Predicted (Female) 1430.3   Initial 6 Minute Walk Distance (Feet) 1400 ft   Resting HR 64 bpm   Exercise HR 78 bpm   Post Exercise HR 60 bpm   Resting /93   Exercise /90   Post Exercise /90   Pre SpO2 96   While Exercising SpO2 96   Post SpO2 99   Effort Rating 3   Current MET Level 3   MET Level Goal 4   ECG Rhythm Sinus rhythm;Other (Comment)  (Inverted T-waves)   Ectopy None   Current Symptoms Denies symptoms   Limitations/Restrictions None   Exercise Prescription   Mode Treadmill;Nustep;Recumbant bike;Arm Ergometer;Weights;Ambulation;Upright bike   Duration/Time Intermittent bouts;15-30 min   Frequency 2 days/week   THR (85% of age predicted max HR) 120.7   OMNI Effort Rating (0-10 Scale) 4-6/10   Progression Intermittent bouts;Total exercise time of 20-30 minutes   Comments 9/24: Pt will be introduced to the cardiac gym at their next session.    Recommended Home Exercise   Type of Exercise Walking   Frequency (days per week) 2-3   Duration (minutes per session) Intermittent;15-30 min   Effort Rating Recommended 4-6/10   30 Day Exercise Plan 9/24: Pt will be encouraged to continue physical activity and manual labor as tolerated.    Current Home Exercise   Type of Exercise None   Frequency (days per week) 0   Duration (minutes per session) 0   Follow-up/On-going Support   Provider follow-up needed on the following No follow-up needed   Learning Assessment   Learner Patient   Primary Language English   Preferred Learning Style Listening;Reading;Demonstration;Pictures/Video   Barriers to Learning No barriers noted   Patient Education   Title of Program Cardiac Rehab Education   Education recommended Anatomy and Physiology of the Heart;Blood Pressure;Exercise Principles;Heart Failure;Medication Overview;Muscle Conditioning;Nutrition;Risk Factors   Education Comments 9/24: Pt will be  provided educational content as it pertains to their personal RF.    Physician cosignature/electronic signature indicates approval of this ITP document. I have established, reviewed and made necessary changes to the individualized treatment plan and exercise prescription for this patient.

## 2021-09-28 ENCOUNTER — HOSPITAL ENCOUNTER (OUTPATIENT)
Dept: CARDIAC REHAB | Facility: HOSPITAL | Age: 81
Setting detail: THERAPIES SERIES
End: 2021-09-28
Attending: INTERNAL MEDICINE
Payer: COMMERCIAL

## 2021-09-28 PROCEDURE — 93798 PHYS/QHP OP CAR RHAB W/ECG: CPT

## 2021-09-28 PROCEDURE — 999N000109 HC STATISTIC OP CR VISIT

## 2021-09-30 ENCOUNTER — HOSPITAL ENCOUNTER (OUTPATIENT)
Dept: CARDIAC REHAB | Facility: HOSPITAL | Age: 81
Setting detail: THERAPIES SERIES
End: 2021-09-30
Attending: INTERNAL MEDICINE
Payer: COMMERCIAL

## 2021-09-30 PROCEDURE — 93798 PHYS/QHP OP CAR RHAB W/ECG: CPT

## 2021-09-30 PROCEDURE — 999N000109 HC STATISTIC OP CR VISIT

## 2021-10-05 ENCOUNTER — HOSPITAL ENCOUNTER (OUTPATIENT)
Dept: CARDIAC REHAB | Facility: HOSPITAL | Age: 81
Setting detail: THERAPIES SERIES
End: 2021-10-05
Attending: INTERNAL MEDICINE
Payer: COMMERCIAL

## 2021-10-05 PROCEDURE — 93798 PHYS/QHP OP CAR RHAB W/ECG: CPT

## 2021-10-05 PROCEDURE — 999N000109 HC STATISTIC OP CR VISIT

## 2021-10-07 ENCOUNTER — HOSPITAL ENCOUNTER (OUTPATIENT)
Dept: CARDIAC REHAB | Facility: HOSPITAL | Age: 81
Setting detail: THERAPIES SERIES
End: 2021-10-07
Attending: INTERNAL MEDICINE
Payer: COMMERCIAL

## 2021-10-07 PROCEDURE — 999N000109 HC STATISTIC OP CR VISIT

## 2021-10-07 PROCEDURE — 93798 PHYS/QHP OP CAR RHAB W/ECG: CPT

## 2021-10-12 ENCOUNTER — HOSPITAL ENCOUNTER (OUTPATIENT)
Dept: CARDIAC REHAB | Facility: HOSPITAL | Age: 81
Setting detail: THERAPIES SERIES
End: 2021-10-12
Attending: INTERNAL MEDICINE
Payer: COMMERCIAL

## 2021-10-12 PROCEDURE — 999N000109 HC STATISTIC OP CR VISIT

## 2021-10-12 PROCEDURE — 93798 PHYS/QHP OP CAR RHAB W/ECG: CPT

## 2021-10-14 ENCOUNTER — HOSPITAL ENCOUNTER (OUTPATIENT)
Dept: CARDIAC REHAB | Facility: HOSPITAL | Age: 81
Setting detail: THERAPIES SERIES
End: 2021-10-14
Attending: INTERNAL MEDICINE
Payer: COMMERCIAL

## 2021-10-14 PROCEDURE — 999N000109 HC STATISTIC OP CR VISIT

## 2021-10-14 PROCEDURE — 93798 PHYS/QHP OP CAR RHAB W/ECG: CPT

## 2021-10-19 ENCOUNTER — HOSPITAL ENCOUNTER (OUTPATIENT)
Dept: CARDIAC REHAB | Facility: HOSPITAL | Age: 81
Setting detail: THERAPIES SERIES
End: 2021-10-19
Attending: INTERNAL MEDICINE
Payer: COMMERCIAL

## 2021-10-19 PROCEDURE — 93798 PHYS/QHP OP CAR RHAB W/ECG: CPT

## 2021-10-19 PROCEDURE — 999N000109 HC STATISTIC OP CR VISIT

## 2021-10-21 NOTE — PROGRESS NOTES
10/21/21 1500   Session   Session Discharge Note   Certified through this date 10/31/21   Cardiac Rehab Assessment   Cardiac Rehab Assessment 10/21:     Cardiac Rehab Discharge Summary    Reason for discharge:    Patient/family request discontinuation of services.    Progress towards goals:  Pt did not set specific goals but was able to tolerate moderate workloads without event.     Recommendation(s):    Continue home exercise program.     Physician cosignature/electronic signature indicates agreements with the ITP document and approval of discharge.

## 2022-01-06 DIAGNOSIS — R06.02 SOB (SHORTNESS OF BREATH): Primary | ICD-10-CM

## 2022-01-15 ENCOUNTER — HOSPITAL ENCOUNTER (EMERGENCY)
Facility: HOSPITAL | Age: 82
Discharge: LEFT BEFORE DISCHARGE | End: 2022-01-15
Admitting: STUDENT IN AN ORGANIZED HEALTH CARE EDUCATION/TRAINING PROGRAM
Payer: COMMERCIAL

## 2022-01-15 VITALS
RESPIRATION RATE: 16 BRPM | SYSTOLIC BLOOD PRESSURE: 151 MMHG | OXYGEN SATURATION: 97 % | DIASTOLIC BLOOD PRESSURE: 83 MMHG | HEART RATE: 64 BPM | TEMPERATURE: 96.9 F

## 2022-01-15 PROCEDURE — 999N000104 HC STATISTIC NO CHARGE

## 2022-03-08 ENCOUNTER — HOSPITAL ENCOUNTER (OUTPATIENT)
Dept: RESPIRATORY THERAPY | Facility: HOSPITAL | Age: 82
Discharge: HOME | End: 2022-03-08
Attending: FAMILY MEDICINE | Admitting: INTERNAL MEDICINE
Payer: MEDICARE

## 2022-03-08 DIAGNOSIS — R06.02 SOB (SHORTNESS OF BREATH): ICD-10-CM

## 2022-03-08 LAB
HGB BLD-MCNC: 14.7 G/DL (ref 13.3–17.7)
HOLD SPECIMEN: NORMAL
HOLD SPECIMEN: NORMAL

## 2022-03-08 PROCEDURE — 250N000009 HC RX 250: Performed by: FAMILY MEDICINE

## 2022-03-08 PROCEDURE — 36415 COLL VENOUS BLD VENIPUNCTURE: CPT | Performed by: FAMILY MEDICINE

## 2022-03-08 PROCEDURE — 94726 PLETHYSMOGRAPHY LUNG VOLUMES: CPT

## 2022-03-08 PROCEDURE — 94060 EVALUATION OF WHEEZING: CPT

## 2022-03-08 PROCEDURE — 85018 HEMOGLOBIN: CPT | Performed by: FAMILY MEDICINE

## 2022-03-08 PROCEDURE — 94729 DIFFUSING CAPACITY: CPT

## 2022-03-08 RX ORDER — ALBUTEROL SULFATE 0.83 MG/ML
2.5 SOLUTION RESPIRATORY (INHALATION)
Status: COMPLETED | OUTPATIENT
Start: 2022-03-08 | End: 2022-03-08

## 2022-03-08 RX ADMIN — ALBUTEROL SULFATE 2.5 MG: 2.5 SOLUTION RESPIRATORY (INHALATION) at 17:03

## 2023-10-12 ENCOUNTER — APPOINTMENT (OUTPATIENT)
Dept: GENERAL RADIOLOGY | Facility: OTHER | Age: 83
End: 2023-10-12
Attending: FAMILY MEDICINE
Payer: MEDICARE

## 2023-10-12 ENCOUNTER — HOSPITAL ENCOUNTER (EMERGENCY)
Facility: OTHER | Age: 83
Discharge: HOME | End: 2023-10-12
Attending: FAMILY MEDICINE | Admitting: FAMILY MEDICINE
Payer: MEDICARE

## 2023-10-12 VITALS
TEMPERATURE: 96.8 F | DIASTOLIC BLOOD PRESSURE: 99 MMHG | HEART RATE: 56 BPM | WEIGHT: 135 LBS | HEIGHT: 69 IN | BODY MASS INDEX: 19.99 KG/M2 | RESPIRATION RATE: 22 BRPM | SYSTOLIC BLOOD PRESSURE: 160 MMHG | OXYGEN SATURATION: 95 %

## 2023-10-12 DIAGNOSIS — J44.1 COPD EXACERBATION (H): ICD-10-CM

## 2023-10-12 LAB
ALBUMIN SERPL BCG-MCNC: 4.2 G/DL (ref 3.5–5.2)
ALP SERPL-CCNC: 148 U/L (ref 40–129)
ALT SERPL W P-5'-P-CCNC: 59 U/L (ref 0–70)
ANION GAP SERPL CALCULATED.3IONS-SCNC: 7 MMOL/L (ref 7–15)
AST SERPL W P-5'-P-CCNC: 59 U/L (ref 0–45)
BASO+EOS+MONOS # BLD AUTO: NORMAL 10*3/UL
BASO+EOS+MONOS NFR BLD AUTO: NORMAL %
BASOPHILS # BLD AUTO: 0 10E3/UL (ref 0–0.2)
BASOPHILS NFR BLD AUTO: 1 %
BILIRUB SERPL-MCNC: 1.6 MG/DL
BUN SERPL-MCNC: 18.1 MG/DL (ref 8–23)
CALCIUM SERPL-MCNC: 9.6 MG/DL (ref 8.8–10.2)
CHLORIDE SERPL-SCNC: 102 MMOL/L (ref 98–107)
CREAT SERPL-MCNC: 0.77 MG/DL (ref 0.67–1.17)
DEPRECATED HCO3 PLAS-SCNC: 30 MMOL/L (ref 22–29)
EGFRCR SERPLBLD CKD-EPI 2021: >90 ML/MIN/1.73M2
EOSINOPHIL # BLD AUTO: 0.1 10E3/UL (ref 0–0.7)
EOSINOPHIL NFR BLD AUTO: 3 %
ERYTHROCYTE [DISTWIDTH] IN BLOOD BY AUTOMATED COUNT: 14.5 % (ref 10–15)
FLUAV RNA SPEC QL NAA+PROBE: NEGATIVE
FLUBV RNA RESP QL NAA+PROBE: NEGATIVE
GLUCOSE SERPL-MCNC: 97 MG/DL (ref 70–99)
HCT VFR BLD AUTO: 44.9 % (ref 40–53)
HGB BLD-MCNC: 14.8 G/DL (ref 13.3–17.7)
HOLD SPECIMEN: NORMAL
HOLD SPECIMEN: NORMAL
IMM GRANULOCYTES # BLD: 0 10E3/UL
IMM GRANULOCYTES NFR BLD: 0 %
LACTATE SERPL-SCNC: 0.6 MMOL/L (ref 0.7–2)
LYMPHOCYTES # BLD AUTO: 0.9 10E3/UL (ref 0.8–5.3)
LYMPHOCYTES NFR BLD AUTO: 21 %
MCH RBC QN AUTO: 29.4 PG (ref 26.5–33)
MCHC RBC AUTO-ENTMCNC: 33 G/DL (ref 31.5–36.5)
MCV RBC AUTO: 89 FL (ref 78–100)
MONOCYTES # BLD AUTO: 0.4 10E3/UL (ref 0–1.3)
MONOCYTES NFR BLD AUTO: 10 %
NEUTROPHILS # BLD AUTO: 2.8 10E3/UL (ref 1.6–8.3)
NEUTROPHILS NFR BLD AUTO: 65 %
NRBC # BLD AUTO: 0 10E3/UL
NRBC BLD AUTO-RTO: 0 /100
PLATELET # BLD AUTO: 203 10E3/UL (ref 150–450)
POTASSIUM SERPL-SCNC: 4.4 MMOL/L (ref 3.4–5.3)
PROCALCITONIN SERPL IA-MCNC: 0.05 NG/ML
PROT SERPL-MCNC: 6.9 G/DL (ref 6.4–8.3)
RBC # BLD AUTO: 5.04 10E6/UL (ref 4.4–5.9)
RSV RNA SPEC NAA+PROBE: NEGATIVE
SARS-COV-2 RNA RESP QL NAA+PROBE: NEGATIVE
SODIUM SERPL-SCNC: 139 MMOL/L (ref 135–145)
TROPONIN T SERPL HS-MCNC: 24 NG/L
TROPONIN T SERPL HS-MCNC: 27 NG/L
WBC # BLD AUTO: 4.3 10E3/UL (ref 4–11)

## 2023-10-12 PROCEDURE — 84145 PROCALCITONIN (PCT): CPT | Performed by: FAMILY MEDICINE

## 2023-10-12 PROCEDURE — C9803 HOPD COVID-19 SPEC COLLECT: HCPCS | Performed by: FAMILY MEDICINE

## 2023-10-12 PROCEDURE — 250N000012 HC RX MED GY IP 250 OP 636 PS 637: Performed by: FAMILY MEDICINE

## 2023-10-12 PROCEDURE — 87040 BLOOD CULTURE FOR BACTERIA: CPT | Performed by: FAMILY MEDICINE

## 2023-10-12 PROCEDURE — 71046 X-RAY EXAM CHEST 2 VIEWS: CPT

## 2023-10-12 PROCEDURE — 94640 AIRWAY INHALATION TREATMENT: CPT

## 2023-10-12 PROCEDURE — 83605 ASSAY OF LACTIC ACID: CPT | Performed by: FAMILY MEDICINE

## 2023-10-12 PROCEDURE — 99285 EMERGENCY DEPT VISIT HI MDM: CPT | Mod: 25 | Performed by: FAMILY MEDICINE

## 2023-10-12 PROCEDURE — 99284 EMERGENCY DEPT VISIT MOD MDM: CPT | Performed by: FAMILY MEDICINE

## 2023-10-12 PROCEDURE — 250N000009 HC RX 250: Performed by: FAMILY MEDICINE

## 2023-10-12 PROCEDURE — 84484 ASSAY OF TROPONIN QUANT: CPT | Performed by: FAMILY MEDICINE

## 2023-10-12 PROCEDURE — 93005 ELECTROCARDIOGRAM TRACING: CPT | Performed by: FAMILY MEDICINE

## 2023-10-12 PROCEDURE — 85025 COMPLETE CBC W/AUTO DIFF WBC: CPT | Performed by: FAMILY MEDICINE

## 2023-10-12 PROCEDURE — 80053 COMPREHEN METABOLIC PANEL: CPT | Performed by: FAMILY MEDICINE

## 2023-10-12 PROCEDURE — 93010 ELECTROCARDIOGRAM REPORT: CPT | Performed by: INTERNAL MEDICINE

## 2023-10-12 PROCEDURE — 36415 COLL VENOUS BLD VENIPUNCTURE: CPT | Performed by: FAMILY MEDICINE

## 2023-10-12 PROCEDURE — 87637 SARSCOV2&INF A&B&RSV AMP PRB: CPT | Performed by: FAMILY MEDICINE

## 2023-10-12 RX ORDER — LEVALBUTEROL TARTRATE 45 UG/1
2 AEROSOL, METERED ORAL EVERY 4 HOURS PRN
Qty: 15 G | Refills: 0 | Status: SHIPPED | OUTPATIENT
Start: 2023-10-12

## 2023-10-12 RX ORDER — PREDNISONE 20 MG/1
40 TABLET ORAL DAILY
Qty: 10 TABLET | Refills: 0 | Status: SHIPPED | OUTPATIENT
Start: 2023-10-13 | End: 2023-10-18

## 2023-10-12 RX ORDER — IPRATROPIUM BROMIDE AND ALBUTEROL SULFATE 2.5; .5 MG/3ML; MG/3ML
3 SOLUTION RESPIRATORY (INHALATION) ONCE
Status: COMPLETED | OUTPATIENT
Start: 2023-10-12 | End: 2023-10-12

## 2023-10-12 RX ORDER — PREDNISONE 20 MG/1
40 TABLET ORAL ONCE
Status: COMPLETED | OUTPATIENT
Start: 2023-10-12 | End: 2023-10-12

## 2023-10-12 RX ORDER — AZITHROMYCIN 250 MG/1
TABLET, FILM COATED ORAL
Qty: 6 TABLET | Refills: 0 | Status: SHIPPED | OUTPATIENT
Start: 2023-10-12 | End: 2023-10-17

## 2023-10-12 RX ADMIN — IPRATROPIUM BROMIDE AND ALBUTEROL SULFATE 3 ML: .5; 3 SOLUTION RESPIRATORY (INHALATION) at 12:00

## 2023-10-12 RX ADMIN — PREDNISONE 40 MG: 20 TABLET ORAL at 13:27

## 2023-10-12 ASSESSMENT — ACTIVITIES OF DAILY LIVING (ADL)
ADLS_ACUITY_SCORE: 35
ADLS_ACUITY_SCORE: 35

## 2023-10-12 NOTE — DISCHARGE INSTRUCTIONS
Testing shows a normal white count and chest x-ray showed no pneumonia  Most likely you have a virus that caused something like bronchitis or a flare of COPD  Take prednisone daily for 5 days to help reduce inflammation  Azithromycin is an antibiotic to help with any bacterial infection that may have caused your breathing difficulty   Use a levalbuterol inhaler 4 times daily to help open your airways and help with breathing.     Return if you are getting more short of breath as your oxygen level could be low

## 2023-10-12 NOTE — ED PROVIDER NOTES
"  History     Chief Complaint   Patient presents with    Chest Pain    Cough     HPI  Kem Abdullahi is a 82 year old male with history of CAD and untreated COPD who presents for breathing difficulty for a few days.  Had a fever for 1 day about a week ago and lots of rhinorrhea since.  Know reports a cough productive of yellow sputum and shortness of breath for a few days.  He reports a history of COPD, but is not on any inhalers or nebulizers at home.  CAD has been stable since last stent couple years ago at [facility].    Allergies:  No Known Allergies    Problem List:    There are no problems to display for this patient.       Past Medical History:    Past Medical History:   Diagnosis Date    Coronary artery disease     ED (erectile dysfunction)     Hyperlipidemia     Hypertension     Stented coronary artery 2006       Past Surgical History:    Past Surgical History:   Procedure Laterality Date    BACK SURGERY  2009    lower back    COLONOSCOPY      HERNIA REPAIR      PHACOEMULSIFICATION WITH STANDARD INTRAOCULAR LENS IMPLANT Left 5/23/2016    Procedure: PHACOEMULSIFICATION WITH STANDARD INTRAOCULAR LENS IMPLANT;  Surgeon: Bi Wayne MD;  Location: [facility] OR       Family History:    History reviewed. No pertinent family history.    Social History:  Marital Status:  Single [1]  Social History     Tobacco Use    Smoking status: Former    Smokeless tobacco: Never   Substance Use Topics    Alcohol use: Yes     Comment: occassionally    Drug use: Not Currently     Comment: Drug use: \"no\"        Medications:    azithromycin (ZITHROMAX) 250 MG tablet  levalbuterol (XOPENEX HFA) 45 MCG/ACT inhaler  [START ON 10/13/2023] predniSONE (DELTASONE) 20 MG tablet  ASPIRIN PO  diltiazem ER (CARDIZEM SR) 60 MG 12 hr capsule  ezetimibe-simvastatin (VYTORIN) 10-40 MG tablet  Tadalafil (CIALIS PO)  zinc 50 MG TABS          Review of Systems    Physical Exam   BP: (!) 173/101  Pulse: 65  Temp: 96.8  F (36  C)  Resp: " "18  Height: 175.3 cm (5' 9\")  Weight: 61.2 kg (135 lb)  SpO2: 95 %      Physical Exam  General Appearance: Pleasant, alert, appropriate appearance for age. No acute distress  Ear Exam: Normal TM's bilaterally. Normal auditory canals  OroPharynx Exam: Normal pharynx.  Neck Exam: Supple, no masses or nodes.  Chest/Respiratory Exam: Normal chest wall and respirations. Clear to auscultation.  Cardiovascular Exam: Regular rate and rhythm. S1, S2, no murmur, click, gallop, or rubs.  Abdomen: Soft, nontender  Extremities: No edema  Psychiatric: Normal affect and mentation    ED Course              ED Course as of 10/12/23 1543   Thu Oct 12, 2023   1105 Productive cough. Wheezing. Ordering neb treatment.   1307 Reports little improvement from DuoNeb.  Lungs improved on auscultation with reduction in wheezing.  Normal WBC.  Procalcitonin negative.  Ordering a dose of prednisone.     Procedures              EKG Interpretation:      Interpreted by Flynn Mera MD  Time reviewed: 1058  Symptoms at time of EKG: SOB   Rhythm: normal sinus rhythm with sinus arrhythmia  Rate: Normal  Axis: Normal  Ectopy: none  Conduction: normal  ST Segments/ T Waves: No ST-T wave changes  Q Waves: none  Comparison to prior: sinus rhythm instead of a-fib present in 5/9/2019    Clinical Impression: normal EKG      Critical Care time:  none         Results for orders placed or performed during the hospital encounter of 10/12/23 (from the past 24 hour(s))   CBC with platelets differential    Narrative    The following orders were created for panel order CBC with platelets differential.  Procedure                               Abnormality         Status                     ---------                               -----------         ------                     CBC with platelets and d...[[id number]]                      Final result                 Please view results for these tests on the individual orders.   Comprehensive metabolic panel "   Result Value Ref [facility]    Sodium 139 135 - 145 mmol/L    Potassium 4.4 3.4 - 5.3 mmol/L    Carbon Dioxide (CO2) 30 (H) 22 - 29 mmol/L    Anion Gap 7 7 - 15 mmol/L    Urea Nitrogen 18.1 8.0 - 23.0 mg/dL    Creatinine 0.77 0.67 - 1.17 mg/dL    GFR Estimate >90 >60 mL/min/1.73m2    Calcium 9.6 8.8 - 10.2 mg/dL    Chloride 102 98 - 107 mmol/L    Glucose 97 70 - 99 mg/dL    Alkaline Phosphatase 148 (H) 40 - 129 U/L    AST 59 (H) 0 - 45 U/L    ALT 59 0 - 70 U/L    Protein Total 6.9 6.4 - 8.3 g/dL    Albumin 4.2 3.5 - 5.2 g/dL    Bilirubin Total 1.6 (H) <=1.2 mg/dL   Troponin T, High Sensitivity   Result Value Ref [facility]    Troponin T, High Sensitivity 27 (H) <=22 ng/L   Procalcitonin   Result Value Ref [facility]    Procalcitonin 0.05 (H) <0.05 ng/mL   Lactic acid whole blood   Result Value Ref [facility]    Lactic Acid 0.6 (L) 0.7 - 2.0 mmol/L   CBC with platelets and differential   Result Value Ref [facility]    WBC Count 4.3 4.0 - 11.0 10e3/uL    RBC Count 5.04 4.40 - 5.90 10e6/uL    Hemoglobin 14.8 13.3 - 17.7 g/dL    Hematocrit 44.9 40.0 - 53.0 %    MCV 89 78 - 100 fL    MCH 29.4 26.5 - 33.0 pg    MCHC 33.0 31.5 - 36.5 g/dL    RDW 14.5 10.0 - 15.0 %    Platelet Count 203 150 - 450 10e3/uL    % Neutrophils 65 %    % Lymphocytes 21 %    % Monocytes 10 %    Mids % (Monos, Eos, Basos)      % Eosinophils 3 %    % Basophils 1 %    % Immature Granulocytes 0 %    NRBCs per 100 WBC 0 <1 /100    Absolute Neutrophils 2.8 1.6 - 8.3 10e3/uL    Absolute Lymphocytes 0.9 0.8 - 5.3 10e3/uL    Absolute Monocytes 0.4 0.0 - 1.3 10e3/uL    Mids Abs (Monos, Eos, Basos)      Absolute Eosinophils 0.1 0.0 - 0.7 10e3/uL    Absolute Basophils 0.0 0.0 - 0.2 10e3/uL    Absolute Immature Granulocytes 0.0 <=0.4 10e3/uL    Absolute NRBCs 0.0 10e3/uL   Extra Tube    Narrative    The following orders were created for panel order Extra Tube.  Procedure                               Abnormality         Status                     ---------                                -----------         ------                     Extra Blue Top Tube[[id number]]                              Final result               Extra Red Top Tube[[id number]]                               Final result                 Please view results for these tests on the individual orders.   Extra Blue Top Tube   Result Value Ref [facility]    Hold Specimen JIC    Extra Red Top Tube   Result Value Ref [facility]    Hold Specimen JIC    Symptomatic Influenza A/B, RSV, & SARS-CoV2 PCR (COVID-19) Nasopharyngeal    Specimen: Nasopharyngeal; Swab   Result Value Ref [facility]    Influenza A PCR Negative Negative    Influenza B PCR Negative Negative    RSV PCR Negative Negative    SARS CoV2 PCR Negative Negative    Narrative    Testing was performed using the Xpert Xpress CoV2/Flu/RSV Assay on the Cepheid GeneXpert Instrument. This test should be ordered for the detection of SARS-CoV-2, influenza, and RSV viruses in individuals who meet clinical and/or epidemiological criteria. Test performance is unknown in asymptomatic patients. This test is for in vitro diagnostic use under the FDA EUA for laboratories certified under CLIA to perform high or moderate complexity testing. This test has not been FDA cleared or approved. A negative result does not rule out the presence of PCR inhibitors in the specimen or target RNA in concentration below the limit of detection for the assay. If only one viral target is positive but coinfection with multiple targets is suspected, the sample should be re-tested with another FDA cleared, approved, or authorized test, if coinfection would change clinical management. This test was validated by the [facility]. These laboratories are certified under the Clinical Laboratory Improvement Amendments of 1988 (CLIA-88) as qualified to perform high complexity laboratory testing.   XR Chest 2 Views    Narrative    PROCEDURE:  XR CHEST 2 VIEWS    HISTORY: productive cough, SOB, wheezing,  ".    COMPARISON:  None    FINDINGS:  The cardiomediastinal contours are normal.  The trachea is midline.  Hyperaeration lung fields with flattening of the diaphragms and  diffuse increased interstitial changes compatible with emphysematous  changes.    No suspicious osseous lesion or subdiaphragmatic free air.      Impression    IMPRESSION:    1. Diffuse emphysematous changes.  2.  No acute cardiopulmonary process.      TERA DOMINGO MD         SYSTEM ID:  [id number]   Troponin T, High Sensitivity   Result Value Ref [facility]    Troponin T, High Sensitivity 24 (H) <=22 ng/L       Medications   ipratropium - albuterol 0.5 mg/2.5 mg/3 mL (DUONEB) neb solution 3 mL (3 mLs Nebulization $Given 10/12/23 1200)   predniSONE (DELTASONE) tablet 40 mg (40 mg Oral $Given 10/12/23 1327)       Assessments & Plan (with Medical Decision Making)     I have reviewed the nursing notes.    I have reviewed the findings, diagnosis, plan and need for follow up with the patient.  Received a DuoNeb with little improvement, but on lung auscultation he has less wheezing.  Initial troponin slightly elevated at 27, recheck improved to 24.  Likely mild demand ischemia given illness.  He is asymptomatic.  White count is normal.  Procalcitonin is low.  He reports having what sounds to be PFTs in the past with a finding of \" 50%.\"  It sounds like he has severe COPD, but is not on any inhalers or treatment at home.  Given prednisone.  Complete a 5-day course of 40 mg daily.  Placed on azithromycin given COPD exacerbation with abnormal sputum.  Use a Xopenex inhaler as needed  Return if worse        Medical Decision Making  The patient's presentation was of moderate complexity (an acute illness with systemic symptoms).    The patient's evaluation involved:  ordering and/or review of 3+ test(s) in this encounter (see separate area of note for details)    The patient's management necessitated moderate risk (prescription drug management including " medications given in the ED).        Discharge Medication List as of 10/12/2023  2:20 PM        START taking these medications    Details   azithromycin (ZITHROMAX) 250 MG tablet Take 2 tablets (500 mg) by mouth daily for 1 day, THEN 1 tablet (250 mg) daily for 4 days., Disp-6 tablet, R-0, E-Prescribe      levalbuterol (XOPENEX HFA) 45 MCG/ACT inhaler Inhale 2 puffs into the lungs every 4 hours as needed for shortness of breath or wheezing, Disp-15 g, R-0, E-Prescribe      predniSONE (DELTASONE) 20 MG tablet Take 2 tablets (40 mg) by mouth daily for 5 days, Disp-10 tablet, R-0, E-Prescribe             Final diagnoses:   COPD exacerbation (H)       10/12/2023   [facility]       Flynn Mera MD  10/12/23 4916

## 2023-10-12 NOTE — ED TRIAGE NOTES
Pt here with congestion, chest pain and a cough for the past 4-5 days.  Pt reports a hx of COPD and a cardiac hx.     Triage Assessment (Adult)       Row Name 10/12/23 1046          Respiratory WDL    Respiratory WDL X;rhythm/pattern;cough     Rhythm/Pattern, Respiratory depth regular     Cough Frequency infrequent        Skin Circulation/Temperature WDL    Skin Circulation/Temperature WDL WDL        Cardiac WDL    Cardiac WDL X;chest pain        Peripheral/Neurovascular WDL    Peripheral Neurovascular WDL WDL        Cognitive/Neuro/Behavioral WDL    Cognitive/Neuro/Behavioral WDL WDL

## 2023-10-13 LAB
ATRIAL RATE - MUSE: 64 BPM
DIASTOLIC BLOOD PRESSURE - MUSE: NORMAL MMHG
INTERPRETATION ECG - MUSE: NORMAL
P AXIS - MUSE: 67 DEGREES
PR INTERVAL - MUSE: 160 MS
QRS DURATION - MUSE: 110 MS
QT - MUSE: 420 MS
QTC - MUSE: 433 MS
R AXIS - MUSE: 82 DEGREES
SYSTOLIC BLOOD PRESSURE - MUSE: NORMAL MMHG
T AXIS - MUSE: -32 DEGREES
VENTRICULAR RATE- MUSE: 64 BPM

## 2023-10-17 LAB
BACTERIA BLD CULT: NO GROWTH
BACTERIA BLD CULT: NO GROWTH

## 2024-09-24 ENCOUNTER — HOSPITAL ENCOUNTER (INPATIENT)
Facility: HOSPITAL | Age: 84
LOS: 1 days | Discharge: HOME | DRG: 066 | End: 2024-09-25
Attending: STUDENT IN AN ORGANIZED HEALTH CARE EDUCATION/TRAINING PROGRAM | Admitting: INTERNAL MEDICINE
Payer: MEDICARE

## 2024-09-24 ENCOUNTER — APPOINTMENT (OUTPATIENT)
Dept: CT IMAGING | Facility: HOSPITAL | Age: 84
DRG: 066 | End: 2024-09-24
Attending: STUDENT IN AN ORGANIZED HEALTH CARE EDUCATION/TRAINING PROGRAM
Payer: MEDICARE

## 2024-09-24 ENCOUNTER — APPOINTMENT (OUTPATIENT)
Dept: MRI IMAGING | Facility: HOSPITAL | Age: 84
DRG: 066 | End: 2024-09-24
Attending: STUDENT IN AN ORGANIZED HEALTH CARE EDUCATION/TRAINING PROGRAM
Payer: MEDICARE

## 2024-09-24 DIAGNOSIS — I63.9 ACUTE ISCHEMIC STROKE (H): ICD-10-CM

## 2024-09-24 DIAGNOSIS — I48.0 PAROXYSMAL ATRIAL FIBRILLATION (H): ICD-10-CM

## 2024-09-24 DIAGNOSIS — I48.0 PAROXYSMAL A-FIB (H): ICD-10-CM

## 2024-09-24 DIAGNOSIS — I63.9 ACUTE ISCHEMIC STROKE (H): Primary | ICD-10-CM

## 2024-09-24 PROBLEM — I50.21 ACUTE SYSTOLIC HEART FAILURE (H): Status: ACTIVE | Noted: 2024-09-24

## 2024-09-24 PROBLEM — J44.9 COPD (CHRONIC OBSTRUCTIVE PULMONARY DISEASE) (H): Status: ACTIVE | Noted: 2024-09-24

## 2024-09-24 PROBLEM — E78.5 HYPERLIPIDEMIA: Status: ACTIVE | Noted: 2021-08-30

## 2024-09-24 PROBLEM — R09.89 CARDIOVASCULAR SYMPTOMS: Status: ACTIVE | Noted: 2024-09-24

## 2024-09-24 PROBLEM — I25.10 ASHD (ARTERIOSCLEROTIC HEART DISEASE): Status: ACTIVE | Noted: 2021-08-30

## 2024-09-24 LAB
ALBUMIN UR-MCNC: 20 MG/DL
ANION GAP SERPL CALCULATED.3IONS-SCNC: 11 MMOL/L (ref 7–15)
APPEARANCE UR: CLEAR
APTT PPP: 33 SECONDS (ref 22–38)
ATRIAL RATE - MUSE: 68 BPM
BASOPHILS # BLD AUTO: 0 10E3/UL (ref 0–0.2)
BASOPHILS NFR BLD AUTO: 0 %
BILIRUB UR QL STRIP: NEGATIVE
BUN SERPL-MCNC: 18.3 MG/DL (ref 8–23)
CALCIUM SERPL-MCNC: 8.7 MG/DL (ref 8.8–10.4)
CHLORIDE SERPL-SCNC: 102 MMOL/L (ref 98–107)
CHOLEST SERPL-MCNC: 134 MG/DL
COLOR UR AUTO: ABNORMAL
CREAT SERPL-MCNC: 0.83 MG/DL (ref 0.67–1.17)
DIASTOLIC BLOOD PRESSURE - MUSE: NORMAL MMHG
EGFRCR SERPLBLD CKD-EPI 2021: 88 ML/MIN/1.73M2
EOSINOPHIL # BLD AUTO: 0.1 10E3/UL (ref 0–0.7)
EOSINOPHIL NFR BLD AUTO: 2 %
ERYTHROCYTE [DISTWIDTH] IN BLOOD BY AUTOMATED COUNT: 14.8 % (ref 10–15)
EST. AVERAGE GLUCOSE BLD GHB EST-MCNC: 117 MG/DL
GLUCOSE SERPL-MCNC: 117 MG/DL (ref 70–99)
GLUCOSE UR STRIP-MCNC: 500 MG/DL
HBA1C MFR BLD: 5.7 %
HCO3 SERPL-SCNC: 24 MMOL/L (ref 22–29)
HCT VFR BLD AUTO: 42.4 % (ref 40–53)
HDLC SERPL-MCNC: 52 MG/DL
HGB BLD-MCNC: 14 G/DL (ref 13.3–17.7)
HGB UR QL STRIP: NEGATIVE
HOLD SPECIMEN: NORMAL
HOLD SPECIMEN: NORMAL
IMM GRANULOCYTES # BLD: 0 10E3/UL
IMM GRANULOCYTES NFR BLD: 0 %
INR PPP: 1.15 (ref 0.85–1.15)
INTERPRETATION ECG - MUSE: NORMAL
KETONES UR STRIP-MCNC: 20 MG/DL
LDLC SERPL CALC-MCNC: 66 MG/DL
LEUKOCYTE ESTERASE UR QL STRIP: NEGATIVE
LYMPHOCYTES # BLD AUTO: 0.6 10E3/UL (ref 0.8–5.3)
LYMPHOCYTES NFR BLD AUTO: 15 %
MCH RBC QN AUTO: 29.9 PG (ref 26.5–33)
MCHC RBC AUTO-ENTMCNC: 33 G/DL (ref 31.5–36.5)
MCV RBC AUTO: 90 FL (ref 78–100)
MONOCYTES # BLD AUTO: 0.6 10E3/UL (ref 0–1.3)
MONOCYTES NFR BLD AUTO: 15 %
MUCOUS THREADS #/AREA URNS LPF: PRESENT /LPF
NEUTROPHILS # BLD AUTO: 2.7 10E3/UL (ref 1.6–8.3)
NEUTROPHILS NFR BLD AUTO: 67 %
NITRATE UR QL: NEGATIVE
NONHDLC SERPL-MCNC: 82 MG/DL
NRBC # BLD AUTO: 0 10E3/UL
NRBC BLD AUTO-RTO: 0 /100
P AXIS - MUSE: 80 DEGREES
PH UR STRIP: 6.5 [PH] (ref 4.7–8)
PLATELET # BLD AUTO: 193 10E3/UL (ref 150–450)
POTASSIUM SERPL-SCNC: 3.7 MMOL/L (ref 3.4–5.3)
PR INTERVAL - MUSE: 170 MS
QRS DURATION - MUSE: 112 MS
QT - MUSE: 450 MS
QTC - MUSE: 478 MS
R AXIS - MUSE: 66 DEGREES
RBC # BLD AUTO: 4.69 10E6/UL (ref 4.4–5.9)
RBC URINE: 2 /HPF
SODIUM SERPL-SCNC: 137 MMOL/L (ref 135–145)
SP GR UR STRIP: 1.01 (ref 1–1.03)
SQUAMOUS EPITHELIAL: 0 /HPF
SYSTOLIC BLOOD PRESSURE - MUSE: NORMAL MMHG
T AXIS - MUSE: -5 DEGREES
TRIGL SERPL-MCNC: 78 MG/DL
TROPONIN T SERPL HS-MCNC: 22 NG/L
UROBILINOGEN UR STRIP-MCNC: NORMAL MG/DL
VENTRICULAR RATE- MUSE: 68 BPM
WBC # BLD AUTO: 4 10E3/UL (ref 4–11)
WBC URINE: <1 /HPF

## 2024-09-24 PROCEDURE — 250N000011 HC RX IP 250 OP 636: Performed by: STUDENT IN AN ORGANIZED HEALTH CARE EDUCATION/TRAINING PROGRAM

## 2024-09-24 PROCEDURE — 70553 MRI BRAIN STEM W/O & W/DYE: CPT | Mod: MG

## 2024-09-24 PROCEDURE — 85610 PROTHROMBIN TIME: CPT | Performed by: STUDENT IN AN ORGANIZED HEALTH CARE EDUCATION/TRAINING PROGRAM

## 2024-09-24 PROCEDURE — A9585 GADOBUTROL INJECTION: HCPCS | Performed by: RADIOLOGY

## 2024-09-24 PROCEDURE — 93010 ELECTROCARDIOGRAM REPORT: CPT | Performed by: INTERNAL MEDICINE

## 2024-09-24 PROCEDURE — 85004 AUTOMATED DIFF WBC COUNT: CPT | Performed by: STUDENT IN AN ORGANIZED HEALTH CARE EDUCATION/TRAINING PROGRAM

## 2024-09-24 PROCEDURE — 250N000013 HC RX MED GY IP 250 OP 250 PS 637: Performed by: INTERNAL MEDICINE

## 2024-09-24 PROCEDURE — 81001 URINALYSIS AUTO W/SCOPE: CPT | Performed by: INTERNAL MEDICINE

## 2024-09-24 PROCEDURE — 70496 CT ANGIOGRAPHY HEAD: CPT | Mod: MG

## 2024-09-24 PROCEDURE — 0042T CT HEAD PERFUSION W CONTRAST: CPT

## 2024-09-24 PROCEDURE — 250N000009 HC RX 250: Performed by: STUDENT IN AN ORGANIZED HEALTH CARE EDUCATION/TRAINING PROGRAM

## 2024-09-24 PROCEDURE — 99291 CRITICAL CARE FIRST HOUR: CPT | Performed by: STUDENT IN AN ORGANIZED HEALTH CARE EDUCATION/TRAINING PROGRAM

## 2024-09-24 PROCEDURE — 999N000157 HC STATISTIC RCP TIME EA 10 MIN

## 2024-09-24 PROCEDURE — 80061 LIPID PANEL: CPT | Performed by: INTERNAL MEDICINE

## 2024-09-24 PROCEDURE — 85730 THROMBOPLASTIN TIME PARTIAL: CPT | Performed by: STUDENT IN AN ORGANIZED HEALTH CARE EDUCATION/TRAINING PROGRAM

## 2024-09-24 PROCEDURE — 84484 ASSAY OF TROPONIN QUANT: CPT | Performed by: STUDENT IN AN ORGANIZED HEALTH CARE EDUCATION/TRAINING PROGRAM

## 2024-09-24 PROCEDURE — 83036 HEMOGLOBIN GLYCOSYLATED A1C: CPT | Performed by: INTERNAL MEDICINE

## 2024-09-24 PROCEDURE — 250N000013 HC RX MED GY IP 250 OP 250 PS 637: Performed by: STUDENT IN AN ORGANIZED HEALTH CARE EDUCATION/TRAINING PROGRAM

## 2024-09-24 PROCEDURE — 99223 1ST HOSP IP/OBS HIGH 75: CPT | Mod: AI | Performed by: INTERNAL MEDICINE

## 2024-09-24 PROCEDURE — 255N000002 HC RX 255 OP 636: Performed by: RADIOLOGY

## 2024-09-24 PROCEDURE — 36415 COLL VENOUS BLD VENIPUNCTURE: CPT | Performed by: STUDENT IN AN ORGANIZED HEALTH CARE EDUCATION/TRAINING PROGRAM

## 2024-09-24 PROCEDURE — 96374 THER/PROPH/DIAG INJ IV PUSH: CPT

## 2024-09-24 PROCEDURE — 120N000001 HC R&B MED SURG/OB

## 2024-09-24 PROCEDURE — 93005 ELECTROCARDIOGRAM TRACING: CPT

## 2024-09-24 PROCEDURE — 99291 CRITICAL CARE FIRST HOUR: CPT | Mod: 25

## 2024-09-24 PROCEDURE — 80048 BASIC METABOLIC PNL TOTAL CA: CPT | Performed by: STUDENT IN AN ORGANIZED HEALTH CARE EDUCATION/TRAINING PROGRAM

## 2024-09-24 PROCEDURE — 70498 CT ANGIOGRAPHY NECK: CPT | Mod: MG

## 2024-09-24 PROCEDURE — G1010 CDSM STANSON: HCPCS

## 2024-09-24 RX ORDER — GADOBUTROL 604.72 MG/ML
7.5 INJECTION INTRAVENOUS ONCE
Status: COMPLETED | OUTPATIENT
Start: 2024-09-24 | End: 2024-09-24

## 2024-09-24 RX ORDER — ACETAMINOPHEN 325 MG/1
650 TABLET ORAL EVERY 4 HOURS PRN
Status: DISCONTINUED | OUTPATIENT
Start: 2024-09-24 | End: 2024-09-25 | Stop reason: HOSPADM

## 2024-09-24 RX ORDER — ONDANSETRON 4 MG/1
4 TABLET, ORALLY DISINTEGRATING ORAL EVERY 6 HOURS PRN
Status: DISCONTINUED | OUTPATIENT
Start: 2024-09-24 | End: 2024-09-25 | Stop reason: HOSPADM

## 2024-09-24 RX ORDER — ASPIRIN 300 MG/1
300 SUPPOSITORY RECTAL ONCE
Status: DISCONTINUED | OUTPATIENT
Start: 2024-09-24 | End: 2024-09-24

## 2024-09-24 RX ORDER — IBUPROFEN 200 MG
200 TABLET ORAL DAILY PRN
Status: ON HOLD | COMMUNITY
End: 2024-09-25

## 2024-09-24 RX ORDER — ASPIRIN 325 MG
325 TABLET, DELAYED RELEASE (ENTERIC COATED) ORAL DAILY
Status: DISCONTINUED | OUTPATIENT
Start: 2024-09-25 | End: 2024-09-25

## 2024-09-24 RX ORDER — ACETAMINOPHEN 650 MG/20.3ML
650 LIQUID ORAL EVERY 4 HOURS PRN
Status: DISCONTINUED | OUTPATIENT
Start: 2024-09-24 | End: 2024-09-25 | Stop reason: HOSPADM

## 2024-09-24 RX ORDER — CARVEDILOL 3.12 MG/1
3.12 TABLET ORAL 2 TIMES DAILY WITH MEALS
Status: ON HOLD | COMMUNITY
Start: 2024-06-17 | End: 2024-09-24

## 2024-09-24 RX ORDER — ALBUTEROL SULFATE 90 UG/1
2 AEROSOL, METERED RESPIRATORY (INHALATION) EVERY 4 HOURS PRN
COMMUNITY
Start: 2024-03-14

## 2024-09-24 RX ORDER — LOSARTAN POTASSIUM 50 MG/1
50 TABLET ORAL DAILY
Status: DISCONTINUED | OUTPATIENT
Start: 2024-09-24 | End: 2024-09-25 | Stop reason: HOSPADM

## 2024-09-24 RX ORDER — ASPIRIN 300 MG/1
300 SUPPOSITORY RECTAL DAILY
Status: DISCONTINUED | OUTPATIENT
Start: 2024-09-25 | End: 2024-09-25

## 2024-09-24 RX ORDER — LOSARTAN POTASSIUM 50 MG/1
50 TABLET ORAL EVERY MORNING
COMMUNITY
Start: 2024-08-13

## 2024-09-24 RX ORDER — ACETAMINOPHEN 650 MG/1
650 SUPPOSITORY RECTAL EVERY 4 HOURS PRN
Status: DISCONTINUED | OUTPATIENT
Start: 2024-09-24 | End: 2024-09-25 | Stop reason: HOSPADM

## 2024-09-24 RX ORDER — NITROGLYCERIN 0.4 MG/1
0.4 TABLET SUBLINGUAL EVERY 5 MIN PRN
COMMUNITY

## 2024-09-24 RX ORDER — ASPIRIN 81 MG/1
324 TABLET, CHEWABLE ORAL DAILY
Status: DISCONTINUED | OUTPATIENT
Start: 2024-09-25 | End: 2024-09-25

## 2024-09-24 RX ORDER — IOPAMIDOL 755 MG/ML
117 INJECTION, SOLUTION INTRAVASCULAR ONCE
Status: COMPLETED | OUTPATIENT
Start: 2024-09-24 | End: 2024-09-24

## 2024-09-24 RX ORDER — DIAZEPAM 10 MG/2ML
2.5 INJECTION, SOLUTION INTRAMUSCULAR; INTRAVENOUS
Status: COMPLETED | OUTPATIENT
Start: 2024-09-24 | End: 2024-09-24

## 2024-09-24 RX ORDER — DIAZEPAM 5 MG
5 TABLET ORAL EVERY 6 HOURS PRN
Status: DISCONTINUED | OUTPATIENT
Start: 2024-09-24 | End: 2024-09-25 | Stop reason: HOSPADM

## 2024-09-24 RX ORDER — ASPIRIN 325 MG
325 TABLET ORAL ONCE
Status: COMPLETED | OUTPATIENT
Start: 2024-09-24 | End: 2024-09-24

## 2024-09-24 RX ORDER — ATORVASTATIN CALCIUM 80 MG/1
40 TABLET, FILM COATED ORAL EVERY EVENING
COMMUNITY
Start: 2024-06-06

## 2024-09-24 RX ORDER — CLOPIDOGREL BISULFATE 75 MG/1
300 TABLET ORAL ONCE
Status: COMPLETED | OUTPATIENT
Start: 2024-09-24 | End: 2024-09-24

## 2024-09-24 RX ORDER — LIDOCAINE 40 MG/G
CREAM TOPICAL
Status: DISCONTINUED | OUTPATIENT
Start: 2024-09-24 | End: 2024-09-25 | Stop reason: HOSPADM

## 2024-09-24 RX ORDER — CLOPIDOGREL BISULFATE 75 MG/1
75 TABLET ORAL DAILY
Status: DISCONTINUED | OUTPATIENT
Start: 2024-09-25 | End: 2024-09-25

## 2024-09-24 RX ORDER — ASPIRIN 325 MG
162.5 TABLET ORAL DAILY
Status: ON HOLD | COMMUNITY
End: 2024-09-25

## 2024-09-24 RX ORDER — ATORVASTATIN CALCIUM 20 MG/1
80 TABLET, FILM COATED ORAL EVERY EVENING
Status: DISCONTINUED | OUTPATIENT
Start: 2024-09-24 | End: 2024-09-25

## 2024-09-24 RX ORDER — ONDANSETRON 2 MG/ML
4 INJECTION INTRAMUSCULAR; INTRAVENOUS ONCE
Status: COMPLETED | OUTPATIENT
Start: 2024-09-24 | End: 2024-09-24

## 2024-09-24 RX ORDER — ONDANSETRON 2 MG/ML
4 INJECTION INTRAMUSCULAR; INTRAVENOUS EVERY 6 HOURS PRN
Status: DISCONTINUED | OUTPATIENT
Start: 2024-09-24 | End: 2024-09-25 | Stop reason: HOSPADM

## 2024-09-24 RX ORDER — SILDENAFIL 100 MG/1
100 TABLET, FILM COATED ORAL DAILY PRN
COMMUNITY
Start: 2024-08-20

## 2024-09-24 RX ORDER — ALBUTEROL SULFATE 90 UG/1
2 AEROSOL, METERED RESPIRATORY (INHALATION) EVERY 4 HOURS PRN
Status: DISCONTINUED | OUTPATIENT
Start: 2024-09-24 | End: 2024-09-25 | Stop reason: HOSPADM

## 2024-09-24 RX ORDER — CETIRIZINE HYDROCHLORIDE 10 MG/1
10 TABLET ORAL DAILY PRN
COMMUNITY

## 2024-09-24 RX ADMIN — ATORVASTATIN CALCIUM 80 MG: 20 TABLET, FILM COATED ORAL at 20:55

## 2024-09-24 RX ADMIN — IOPAMIDOL 117 ML: 755 INJECTION, SOLUTION INTRAVENOUS at 08:55

## 2024-09-24 RX ADMIN — ASPIRIN 325 MG ORAL TABLET 325 MG: 325 PILL ORAL at 09:40

## 2024-09-24 RX ADMIN — DIAZEPAM 2.5 MG: 10 INJECTION, SOLUTION INTRAMUSCULAR; INTRAVENOUS at 10:04

## 2024-09-24 RX ADMIN — GADOBUTROL 7.5 ML: 604.72 INJECTION INTRAVENOUS at 10:43

## 2024-09-24 RX ADMIN — ONDANSETRON 4 MG: 2 INJECTION INTRAMUSCULAR; INTRAVENOUS at 09:39

## 2024-09-24 RX ADMIN — SODIUM CHLORIDE 100 ML: 9 INJECTION, SOLUTION INTRAVENOUS at 08:55

## 2024-09-24 RX ADMIN — CLOPIDOGREL BISULFATE 300 MG: 75 TABLET, FILM COATED ORAL at 14:13

## 2024-09-24 ASSESSMENT — ACTIVITIES OF DAILY LIVING (ADL)
ADLS_ACUITY_SCORE: 22
ADLS_ACUITY_SCORE: 37
ADLS_ACUITY_SCORE: 22
ADLS_ACUITY_SCORE: 22
ADLS_ACUITY_SCORE: 20
ADLS_ACUITY_SCORE: 22
ADLS_ACUITY_SCORE: 20
ADLS_ACUITY_SCORE: 37
ADLS_ACUITY_SCORE: 23
ADLS_ACUITY_SCORE: 22
ADLS_ACUITY_SCORE: 35

## 2024-09-24 ASSESSMENT — COLUMBIA-SUICIDE SEVERITY RATING SCALE - C-SSRS
1. IN THE PAST MONTH, HAVE YOU WISHED YOU WERE DEAD OR WISHED YOU COULD GO TO SLEEP AND NOT WAKE UP?: NO
2. HAVE YOU ACTUALLY HAD ANY THOUGHTS OF KILLING YOURSELF IN THE PAST MONTH?: NO
6. HAVE YOU EVER DONE ANYTHING, STARTED TO DO ANYTHING, OR PREPARED TO DO ANYTHING TO END YOUR LIFE?: NO

## 2024-09-24 NOTE — ED NOTES
Bed: ED11a  Expected date: 9/24/24  Expected time:   Means of arrival:   Comments:  Procious Stroke S/S

## 2024-09-24 NOTE — CARE PLAN
[facility] Inpatient Admission Note:    Patient admitted to 3230/3230-1 at approximately 1050 via bed accompanied by significant other from emergency room . Report received from Lavell in SBAR format at 10:40 via telephone. Patient transferred to bed via slide board.. Patient is alert and oriented X 3, denies pain; rates at 0 on 0-10 scale.  Patient oriented to room, unit, hourly rounding, and plan of care. Explained admission packet and patient handbook with patient bill of rights brochure. Will continue to monitor and document as needed.     Inpatient Nursing criteria listed below was met:    Health care directives status obtained and documented: No    Patient identifies a surrogate decision maker: no      If initial lactic acid greater than 2.0, repeat lactic acid drawn within one hour of arrival to unit: NA.     Clergy visit ordered if patient requests: Yes    Skin issues/needs documented: N/A    Isolation Patient: no Education given, correct sign in place and documentation row added to PCS:  Yes    Fall Prevention Yes: Care plan updated, education given and documented, sticker and magnet in place: Yes    Care Plan initiated: Yes    Education Documented (including assessment): Yes    Patient has discharge needs : No If yes,       /87 (BP Location: Left arm, Patient Position: Supine, Cuff Size: Adult Regular)   Pulse 56   Temp (!) 95.5  F (35.3  C) (Tympanic)   Resp 18   Wt 64.6 kg (142 lb 6.7 oz)   SpO2 93%   BMI 21.03 kg/m

## 2024-09-24 NOTE — ED PROVIDER NOTES
"  History     Chief Complaint   Patient presents with    Stroke Symptoms     HPI  Kem Abdullahi is a 83 year old male who presents with right facial droop, right leg weakness, dizziness, slurred speech. Woke up with these symptoms around 0800 today. Went to bed normal at 10pm last night. No CP. No SOB. No hx of stroke. Not on anticoagulation. No fevers. No recent illness. No vomiting or diarrhea but is mildly nauseous.     Allergies:  No Known Allergies    Problem List:    Patient Active Problem List    Diagnosis Date Noted    Acute systolic heart failure (H) 09/24/2024     Priority: Medium    Cardiovascular symptoms 09/24/2024     Priority: Medium    Acute ischemic stroke (H) 09/24/2024     Priority: Medium    ASHD (arteriosclerotic heart disease) 08/30/2021     Priority: Medium     Formatting of this note might be different from the original.    of RCA      Hyperlipidemia 08/30/2021     Priority: Medium        Past Medical History:    Past Medical History:   Diagnosis Date    Coronary artery disease     ED (erectile dysfunction)     Hyperlipidemia     Hypertension     Stented coronary artery 2006       Past Surgical History:    Past Surgical History:   Procedure Laterality Date    BACK SURGERY  2009    lower back    COLONOSCOPY      HERNIA REPAIR      PHACOEMULSIFICATION WITH STANDARD INTRAOCULAR LENS IMPLANT Left 5/23/2016    Procedure: PHACOEMULSIFICATION WITH STANDARD INTRAOCULAR LENS IMPLANT;  Surgeon: Bi Wayne MD;  Location: [facility] OR       Family History:    No family history on file.    Social History:  Marital Status:  Single [1]  Social History     Tobacco Use    Smoking status: Former    Smokeless tobacco: Never   Substance Use Topics    Alcohol use: Yes     Comment: occassionally    Drug use: Not Currently     Comment: Drug use: \"no\"        Medications:    ASPIRIN PO  diltiazem ER (CARDIZEM SR) 60 MG 12 hr capsule  ezetimibe-simvastatin (VYTORIN) 10-40 MG tablet  levalbuterol (XOPENEX HFA) 45 " MCG/ACT inhaler  Tadalafil (CIALIS PO)  zinc 50 MG TABS          Review of Systems   All other systems reviewed and are negative.      Physical Exam   BP: (!) 217/101  Pulse: 83  Temp: 97.4  F (36.3  C)  Resp: 20  Weight: 64.6 kg (142 lb 6.7 oz)  SpO2: 98 %      Physical Exam  Vitals and nursing note reviewed.   Constitutional:       General: He is not in acute distress.     Appearance: Normal appearance. He is not ill-appearing or toxic-appearing.   HENT:      Head: Normocephalic.      Right Ear: External ear normal.      Left Ear: External ear normal.      Nose: Nose normal. No congestion.      Mouth/Throat:      Mouth: Mucous membranes are moist.      Pharynx: Oropharynx is clear.   Eyes:      Extraocular Movements: Extraocular movements intact.      Pupils: Pupils are equal, round, and reactive to light.   Cardiovascular:      Rate and Rhythm: Normal rate and regular rhythm.      Pulses: Normal pulses.      Heart sounds: Normal heart sounds.   Pulmonary:      Effort: Pulmonary effort is normal.      Breath sounds: Normal breath sounds.   Abdominal:      General: Abdomen is flat.      Palpations: Abdomen is soft.      Tenderness: There is no abdominal tenderness.   Musculoskeletal:         General: No swelling or tenderness. Normal range of motion.      Cervical back: Normal range of motion. No rigidity or tenderness.   Skin:     General: Skin is warm.      Capillary Refill: Capillary refill takes less than 2 seconds.   Neurological:      General: No focal deficit present.      Mental Status: He is alert and oriented to person, place, and time.   Psychiatric:         Mood and Affect: Mood normal.         Behavior: Behavior normal.         ED Course     ED Course as of 09/24/24 0944   Tue Sep 24, 2024   0901 Old right front infarct noted on head CT. NO new infarct.     Procedures              EKG Interpretation:      Interpreted by ANNABEL ASENCIO MD  Time reviewed: 0915  Symptoms at time of EKG: Right sided  weakness   Rhythm: normal sinus   Rate: normal  Axis: normal  Ectopy: none  Conduction: normal  ST Segments/ T Waves: Non-specific lead II and III ST-T wave changes  Q Waves: none  Comparison to prior: Unchanged from prior    Clinical Impression: normal EKG      Critical Care time:  was 45 minutes for this patient excluding procedures.    The patient has stroke symptoms:         ED Stroke specific documentation           NIHSS PDF     Patient last known well time: 9/23/24 at 2200  ED Provider first to bedside at: on arrival  CT Results received at: see medical record    Thrombolytics:   Not given due to:   - not in appropriate time frame    Endovascular Retrieval:  Not initiated due to absence of proximal vessel occlusion    See MAR for documentation of NIH score on arrival. Unchanged while here.    Stroke Mimics were considered (including migraine headache, seizure disorder, hypoglycemia (or hyperglycemia), head or spinal trauma, CNS infection, Toxin ingestion and shock state (e.g. sepsis) .           Results for orders placed or performed during the hospital encounter of 09/24/24 (from the past 24 hour(s))   CT Head w/o Contrast    Narrative    Exam: CT HEAD W/O CONTRAST    Clinical history:83 years Male Code Stroke to evaluate for potential  thrombolysis and thrombectomy. PLEASE READ IMMEDIATELY.  . Patient  woke up with right-sided facial droop and vertigo.    Comparisons:None    Technique: Axial CT imaging of the head was performed without  intervenous contrast.  This exam was performed using one or more of the following dose  reduction techniques:  Automated exposure control, adjustment of the TAMI and/or KV according  to patient's size, and/or use of iterative reconstruction technique.    FINDINGS: There is a hypodensity compatible with encephalomalacia  involving the posterior right frontal lobe. This is likely sequela of  an old infarct.   Ventricles and sulci are symmetric. The gray-white  matter  differentiation throughout the brain is otherwise well maintained.  There is no evidence of intracranial mass or hemorrhage. Visualized  portions of the paranasal sinuses and mastoid air cells are well  aerated.   There is no evidence of skull fracture.      Impression    IMPRESSION: No acute changes. No evidence of mass or hemorrhage.    Old infarct right posterior frontal lobe.    These findings were called to the emergency room provider    NEDA FERNÁNDEZ MD         SYSTEM ID:  [id number]   CBC with Platelets & Differential    Narrative    The following orders were created for panel order CBC with Platelets & Differential.  Procedure                               Abnormality         Status                     ---------                               -----------         ------                     CBC with platelets and d...[[id number]]  Abnormal            Final result                 Please view results for these tests on the individual orders.   Basic metabolic panel   Result Value Ref Range    Sodium 137 135 - 145 mmol/L    Potassium 3.7 3.4 - 5.3 mmol/L    Chloride 102 98 - 107 mmol/L    Carbon Dioxide (CO2) 24 22 - 29 mmol/L    Anion Gap 11 7 - 15 mmol/L    Urea Nitrogen 18.3 8.0 - 23.0 mg/dL    Creatinine 0.83 0.67 - 1.17 mg/dL    GFR Estimate 88 >60 mL/min/1.73m2    Calcium 8.7 (L) 8.8 - 10.4 mg/dL    Glucose 117 (H) 70 - 99 mg/dL   INR   Result Value Ref Range    INR 1.15 0.85 - 1.15   Partial thromboplastin time   Result Value Ref Range    aPTT 33 22 - 38 Seconds   Troponin T, High Sensitivity   Result Value Ref Range    Troponin T, High Sensitivity 22 <=22 ng/L   CBC with platelets and differential   Result Value Ref Range    WBC Count 4.0 4.0 - 11.0 10e3/uL    RBC Count 4.69 4.40 - 5.90 10e6/uL    Hemoglobin 14.0 13.3 - 17.7 g/dL    Hematocrit 42.4 40.0 - 53.0 %    MCV 90 78 - 100 fL    MCH 29.9 26.5 - 33.0 pg    MCHC 33.0 31.5 - 36.5 g/dL    RDW 14.8 10.0 - 15.0 %    Platelet Count 193 150 - 450  10e3/uL    % Neutrophils 67 %    % Lymphocytes 15 %    % Monocytes 15 %    % Eosinophils 2 %    % Basophils 0 %    % Immature Granulocytes 0 %    NRBCs per 100 WBC 0 <1 /100    Absolute Neutrophils 2.7 1.6 - 8.3 10e3/uL    Absolute Lymphocytes 0.6 (L) 0.8 - 5.3 10e3/uL    Absolute Monocytes 0.6 0.0 - 1.3 10e3/uL    Absolute Eosinophils 0.1 0.0 - 0.7 10e3/uL    Absolute Basophils 0.0 0.0 - 0.2 10e3/uL    Absolute Immature Granulocytes 0.0 <=0.4 10e3/uL    Absolute NRBCs 0.0 10e3/uL   Extra Tube    Narrative    The following orders were created for panel order Extra Tube.  Procedure                               Abnormality         Status                     ---------                               -----------         ------                     Extra Red Top Tube[[id number]]                               In process                 Extra Heparinized Syringe[[id number]]                        In process                   Please view results for these tests on the individual orders.   CTA Head Neck with Contrast    Narrative    CTA HEAD NECK W CONTRAST    HISTORY: 83 years Male Code Stroke to evaluate for potential  thrombolysis and thrombectomy. PLEASE READ IMMEDIATELY.    COMPARISON: None    TECHNIQUE: Contrast-enhanced CT angiography of the neck and head was  performed with intervenous contrast. Multiplanar reconstructions and  MIP, volume rendered and 3-D MIP reconstructions were obtained on a  3-D workstation.  This exam was performed using one or more of the following dose  reduction techniques:  Automated exposure control, adjustment of the TAMI and/or KV according  to patient's size, and/or use of iterative reconstruction technique.    FINDINGS:    Right neck:            Brachiocephalic artery: patent            Common carotid artery:  patent            Internal carotid artery: There is calcified atherosclerotic  plaque with stenosis of less than 50% of the carotid bulb.            Vertebral artery:Widely patent                 Left neck:            Common carotid artery: Widely patent            Internal carotid artery: There is calcified atherosclerotic  plaque with stenosis of less than 50%.            Vertebral artery:Widely patent      Head:                      Anterior circulation:The vessels are patent without evidence  of abrupt cut off. There is no evidence of aneurysm.           Posterior circulation:The vessels are patent. There is no  abrupt cut off. There is no evidence of aneurysm      Impression    IMPRESSION:No acute findings. There is no evidence of significant  carotid artery stenosis. There is no evidence of large vessel  occlusion or intracranial aneurysm.    NEDA FERNÁNDEZ MD         SYSTEM ID:  [id number]   EKG 12-lead, tracing only   Result Value Ref Range    Systolic Blood Pressure  mmHg    Diastolic Blood Pressure  mmHg    Ventricular Rate 68 BPM    Atrial Rate 68 BPM    KS Interval 170 ms    QRS Duration 112 ms     ms    QTc 478 ms    P Axis 80 degrees    R AXIS 66 degrees    T Axis -5 degrees    Interpretation ECG       Sinus rhythm  Possible Inferior infarct (cited on or before 12-Oct-2023)  Abnormal ECG  When compared with ECG of 12-Oct-2023 10:50,  No significant change was found     CT Head Perfusion w Contrast - For Tier 2 Stroke    Narrative    EXAM: CT HEAD PERFUSION W CONTRAST 9/24/2024 9:10 AM    PROVIDED HISTORY: Code Stroke to evaluate for potential thrombolysis  and thrombectomy. Evaluate mismatch between penumbra and core infarct.  READ IMMEDIATELY. Large vessel occlusion    COMPARISON: CT head and CTA head and neck 9/24/2024    TECHNIQUE:   CT BRAIN PERFUSION: Dynamic perfusion CT of the brain was performed at  multiple levels after administration of intravenous contrast; Image  processing was performed by [employer] for the generation of color rCBF,  rCBV, Tmax, and MTT.     CONTRAST: ISOVUE 370  50mL    FINDINGS:     CT Perfusion:   Images documenting relative cerebral blood  volume, cerebral blood flow  and mean transit time demonstrate no evidence of acute infarction.  There is abnormal perfusion in the right frontal opercular region  where there is a 7 mL volume of tissue demonstrating CBF of less than  30% and 5 mL volume of demonstrating Tmax greater than 6 seconds.  Correlating with CT head, findings are consistent with chronic area of  hypoperfusion secondary to prior infarct.      Impression    IMPRESSION:     No evidence of acute infarction on the CT Perfusion examination.    Chronic right frontal opercular infarct.    [Result: No acute abnormality on CT head perfusion]    Finding was identified on 9/24/2024 9:26 AM.     Provider ANNABEL ASENCIO discussed results over the phone with Dr. Pleitez at 9/24/2024 9:32 AM and verbalized understanding of the  result.     PAMELA PLEITEZ MD         SYSTEM ID:  [id number]       Medications   diazepam (VALIUM) injection 2.5 mg (has no administration in time range)   iopamidol (ISOVUE-370) solution 117 mL (117 mLs Intravenous $Given 9/24/24 0855)     And   sodium chloride 0.9 % bag for CT scan flush use (100 mLs As instructed $Given 9/24/24 0855)   ondansetron (ZOFRAN) injection 4 mg (4 mg Intravenous $Given 9/24/24 0939)   aspirin (ASA) tablet 325 mg (325 mg Oral $Given 9/24/24 0940)       Assessments & Plan (with Medical Decision Making)     I have reviewed the nursing notes.    83yoM who woke up with stroke like symptoms. Last known normal was last night. No large vessel occlusion. Not a candidate for lytics. Aspirin initiated. Cts did not show hemorrhage or other acute findings. Plan on MRI and admission.    Discussed with neurology and hospitalist who graciously accepted plan for admission.    I have reviewed the findings, diagnosis, plan and need for follow up with the patient.     Critical Care Addendum  My initial assessment, based on my review of prehospital provider report, review of nursing observations, review of vital signs, focused  history, physical exam, 12 lead ECG analysis, and discussion with neurology/radiology , established a high suspicion that Kem Abdullahi has ischemic or hemorrhagic stroke, which requires immediate intervention, and therefore he is critically ill.     After the initial assessment, the care team initiated multiple lab tests, initiated medication therapy with aspirin, and consulted with neurology/radiology  to provide stabilization care. Due to the critical nature of this patient, I reassessed nursing observations, vital signs, 12 lead ECG analysis, and neurologic status multiple times prior to his disposition.     Time also spent performing documentation, discussion with family to obtain medical information for decision making, reviewing test results, discussion with consultants, and coordination of care.     Critical care time (excluding teaching time and procedures): 45 minutes.           New Prescriptions    No medications on file       Final diagnoses:   Acute ischemic stroke (H)       9/24/2024   [facility]       Ronnie Long MD  09/24/24 6140

## 2024-09-24 NOTE — PLAN OF CARE
"Plan of Care Reviewed With: Patient    Overall Patient Progress: Progressing    /75 (BP Location: Left arm, Patient Position: Semi-Méndez's, Cuff Size: Adult Regular)   Pulse 72   Temp 97.4  F (36.3  C) (Tympanic)   Resp 20   Ht 1.74 m (5' 8.5\")   Wt 60.8 kg (134 lb 0.6 oz)   SpO2 93%   BMI 20.08 kg/m       Pt came up to the floor at 11:09 am from the ED. At 8 am this morning he woke up with right sided face drooping, right leg weakness, dizziness, and slurred speech. Patient got a CT down in the ER that came back with results of hypodensity compatible with encephalomalacia involving the posterior right frontal lobe. Upon arrival, pt has no facial drooping and bilateral strength of he's left and right side. Patient is still dizzy making him feel nausea, therefore impacting his ability to eat and drink fluids. Patient did pass his dysphagia screening and has no signs of impaired swallowing. Pt is A/O X4, is able to make needs know and communicates clearly. Patient is placed close to the desk, door is open, nonskid socks, and orientated to the room and call light for patients safety. Pt's vitals and neuro's are due every 4 hours. Patient was put on 1.5 liters of O2 to maintain stats over 94%  Face to face report given with opportunity to observe patient.    Report given to AMAYA Beltran.    Joy Rivera RN  9/24/2024,   7:05 PM         "

## 2024-09-24 NOTE — H&P
[facility]    History and Physical - Hospitalist Service       Date of Admission:  9/24/2024    Assessment & Plan      Kem Abdullahi is a 83 year old male admitted on 9/24/2024. He presented to the emergency room with onset of right facial numbness gait abnormality upon awaking this morning    1.  Acute ischemic stroke: Patient presented with symptoms of slightly slurred speech some right-sided facial numbness possible droop and some gait abnormalities.  He went to bed last evening asymptomatic.  Has been having no issues in fact just returned from a trip to [place].  He came to the ER for immediate valuation.  CT showed nothing acute.  He was seen by stroke neurology.  They recommended aspirin Plavix.  MRI was subsequently performed it did show a very small right acute subcortical infarct in the perirolandic region.  He does have a more chronic appearing right MCA stroke with encephalomalacia.  Patient was not felt to be candidate for intervention.  The CT angiogram did not show any significant stenosis.  No current evidence of atrial fibrillation.  Does have some ongoing dizziness.  He is getting the load of aspirin Plavix.  Will have an echocardiogram performed.  No obvious dysphagia now.  All of his symptoms and exam findings have basically resolved.  Will be reevaluated by stroke neurology.    2.  Coronary artery disease: Last interventions were back in 2021 where he had intervention on a chronically occluded right coronary artery.  His EF from that time was 35 to 40%.  No history of any atrial fibrillation.  He is going to have a scheduled outpatient stress test in the near future by his cardiology although he is having no current symptoms.  Will let him have passive hypertension.  Will not aggressively intervene at this point.  Pressures were quite high when he came in but they have already come down to 150 systolic range.    3.  COPD: Was diagnosed with this recently.  Does have inhaler as needed.   No evidence of any acute exacerbation.  Sats are 93% on room air.    4.  Dizziness: Complains of just some dizziness.  Some mild nausea.  Just flew back from [place] this may be part of the problem.  His TMs look fine.  Could be from his stroke also it is difficult to sort out.  Does have some antiemetics ordered.  Will give him a dose of some low-dose diazepam at this time.  No evidence of any cerebellar or posterior stroke involvement.        Diet: Regular Diet Adult    DVT Prophylaxis: Pneumatic Compression Devices  Colon Catheter: Not present  Lines: None     Cardiac Monitoring: ACTIVE order. Indication: Stroke, acute (48 hours)  Code Status: Full Code      Clinically Significant Risk Factors Present on Admission                # Drug Induced Platelet Defect: home medication list includes an antiplatelet medication   # Hypertension: Home medication list includes antihypertensive(s)                    Disposition Plan     Medically Ready for Discharge: Anticipated in 2-4 Days           Lee Cole MD  Hospitalist Service  [facility]  Securely message with [employer] (more info)  Text page via [facility] Paging/Directory     ______________________________________________________________________    Chief Complaint   Right facial weakness and possible right-sided weakness with impaired gait    History is obtained from the patient, electronic health record, emergency department physician, and patient's spouse    History of Present Illness   Kem Abdullahi is a 83 year old male who presented the emergency room after awakening this morning noticed some right facial, numbness some dizziness.  He got up out of bed and fell to the floor did not pass out.  Just felt very weak.  His wife came and helped him back into bed.  She noticed that his speech was maybe slightly slurred did have some right facial droop.  He had no chest pains or palpitations.  Some mild nausea.  They called 911 is brought to the ER for  evaluation.  Upon arrival to the ER his blood pressure was 217/101 pulse is 83 and regular sats are 98% on room air he was afebrile.  Per report he was noted to have a right-sided facial droop.  He was sent to the CT scanner immediately for a code stroke.  Did not show anything acute on the CT scan did show an old posterior frontal lobe area of encephalomalacia.  Stroke neurology saw the patient did not feel that he was appropriate for any type of thrombolytic intervention.  He went to bed last night feeling well.  They actually had been on a trip to [place] for the last week and flew home the other day.  He had no issues while he was there.  And no issues when he went to bed last night.  He is usually up ambulatory without any specific issues and complaints.  Did have some dyspnea but he has a diagnosis of COPD.  No specific chest pains or palpitations.  He has had no bleeding troubles at all and no recent cold or flu bugs.  Saw his cardiologist I think last week or so.  They discontinued his Coreg cut back on his atorvastatin.  Continue with the losartan.  Will going to set him up for a stress test just to check him out.  Does have a previous history of stenting back in 2001.    In the ER he was given aspirin.  Stroke neurology recommended admission with routine stroke evaluation.  Echo telemetry lipids etc.  Was also loaded on 300 mg of Plavix.      Past Medical History    Past Medical History:   Diagnosis Date    Coronary artery disease     ED (erectile dysfunction)     Hyperlipidemia     Hypertension     Stented coronary artery 2006   *7/2021: Coronary angiography done.  Chronic total occlusion distal RCA was opened with 2 drug-eluting stents, mid LAD lesion 56%, circumflex without any significant disease.  Right posterior descending artery is filled mainly via septal collaterals.  *Echocardiogram-8/30/2021: Normal LV size moderately reduced global systolic function with estimated EF 35 to 40%.  Inferior and  posterior wall are abnormal (akinetic).  No pericardial effusion.  Sclerotic mitral valve.  *COPD    Past Surgical History   Past Surgical History:   Procedure Laterality Date    BACK SURGERY  2009    lower back    COLONOSCOPY      HERNIA REPAIR      PHACOEMULSIFICATION WITH STANDARD INTRAOCULAR LENS IMPLANT Left 5/23/2016    Procedure: PHACOEMULSIFICATION WITH STANDARD INTRAOCULAR LENS IMPLANT;  Surgeon: Bi Wayne MD;  Location: [facility] OR       Prior to Admission Medications   Prior to Admission Medications   Prescriptions Last Dose Informant Patient Reported? Taking?   albuterol (PROAIR HFA/PROVENTIL HFA/VENTOLIN HFA) 108 (90 Base) MCG/ACT inhaler More than a month at PRN  Yes Yes   Sig: Inhale 2 puffs into the lungs every 4 hours as needed.   aspirin (ASA) 325 MG tablet 9/23/2024  Yes Yes   Sig: Take 162.5 mg by mouth daily.   atorvastatin (LIPITOR) 80 MG tablet 9/23/2024 at PM  Yes Yes   Sig: Take 40 mg by mouth every evening.   cetirizine (ZYRTEC) 10 MG tablet Past Week  Yes Yes   Sig: Take 10 mg by mouth daily as needed (nasal drainage).   ibuprofen (ADVIL/MOTRIN) 200 MG tablet 9/23/2024 at HS  Yes Yes   Sig: Take 200 mg by mouth daily as needed (neck soreness).   levalbuterol (XOPENEX HFA) 45 MCG/ACT inhaler More than a month at PRN  No Yes   Sig: Inhale 2 puffs into the lungs every 4 hours as needed for shortness of breath or wheezing   losartan (COZAAR) 50 MG tablet 9/23/2024 at AM  Yes Yes   Sig: Take 50 mg by mouth every morning.   nitroGLYcerin (NITROSTAT) 0.4 MG sublingual tablet PRN at PRN  Yes Yes   Sig: Place 0.4 mg under the tongue every 5 minutes as needed for chest pain. -for 3 doses. If symptoms persist 5 minutes after 1st dose call 911.   sildenafil (VIAGRA) 100 MG tablet Past Week  Yes Yes   Sig: Take 100 mg by mouth daily as needed (sexual activity). : administer 30 minutes to 4 hours before activity. Do not use with nitroglycerin.   zinc 50 MG TABS 9/23/2024 at 800  Yes Yes   Sig: Take 1  "tablet by mouth daily      Facility-Administered Medications: None        Review of Systems    The 10 point Review of Systems is negative other than noted in the HPI or here.      Social History   I have reviewed this patient's social history and updated it with pertinent information if needed.  Social History     Tobacco Use    Smoking status: Former    Smokeless tobacco: Never   Substance Use Topics    Alcohol use: Yes     Comment: occassionally    Drug use: Not Currently     Comment: Drug use: \"no\"         Family History    unobtainable      Allergies   No Known Allergies     Physical Exam   Vital Signs: Temp: (!) 95.5  F (35.3  C) Temp src: Tympanic BP: 154/87 Pulse: 56   Resp: 18 SpO2: 93 % O2 Device: None (Room air)    Weight: 142 lbs 6.67 oz    Constitutional: awake, alert, cooperative, no apparent distress, and appears stated age  Eyes: Lids and lashes normal, pupils equal, round and reactive to light, extra ocular muscles intact, sclera clear, conjunctiva normal  ENT: Normocephalic, without obvious abnormality, atraumatic, sinuses nontender on palpation, external ears without lesions, oral pharynx with moist mucous membranes, tonsils without erythema or exudates, gums normal and good dentition.,  TMs look fine.  Does have some excess cerumen in the right canal  Respiratory: No increased work of breathing, good air exchange, clear to auscultation bilaterally, no crackles or wheezing  Cardiovascular: Normal apical impulse, regular rate and rhythm, normal S1 and S2, no S3 or S4, and no murmur noted  GI: No scars, normal bowel sounds, soft, non-distended, non-tender, no masses palpated, no hepatosplenomegally  Skin: no bruising or bleeding  Musculoskeletal: There is no redness, warmth, or swelling of the joints.  Full range of motion noted.  Motor strength is 5 out of 5 all extremities bilaterally.  Tone is normal.  Neurologic: Awake, alert, oriented to name, place and time.  Cranial nerves II-XII are grossly " intact.  Motor is 5 out of 5 bilaterally.  Cerebellar finger to nose, heel to shin intact.  Sensory is intact.  Babinski down going, Romberg negative, and gait is normal.    Medical Decision Making       75 MINUTES SPENT BY ME on the date of service doing chart review, history, exam, documentation & further activities per the note.      Data     I have personally reviewed the following data over the past 24 hrs:    4.0  \   14.0   / 193     137 102 18.3 /  117 (H)   3.7 24 0.83 \     Trop: 22 BNP: N/A     INR:  1.15 PTT:  33   D-dimer:  N/A Fibrinogen:  N/A       Imaging results reviewed over the past 24 hrs:   Recent Results (from the past 24 hour(s))   CT Head w/o Contrast    Narrative    Exam: CT HEAD W/O CONTRAST    Clinical history:83 years Male Code Stroke to evaluate for potential  thrombolysis and thrombectomy. PLEASE READ IMMEDIATELY.  . Patient  woke up with right-sided facial droop and vertigo.    Comparisons:None    Technique: Axial CT imaging of the head was performed without  intervenous contrast.  This exam was performed using one or more of the following dose  reduction techniques:  Automated exposure control, adjustment of the TAMI and/or KV according  to patient's size, and/or use of iterative reconstruction technique.    FINDINGS: There is a hypodensity compatible with encephalomalacia  involving the posterior right frontal lobe. This is likely sequela of  an old infarct.   Ventricles and sulci are symmetric. The gray-white matter  differentiation throughout the brain is otherwise well maintained.  There is no evidence of intracranial mass or hemorrhage. Visualized  portions of the paranasal sinuses and mastoid air cells are well  aerated.   There is no evidence of skull fracture.      Impression    IMPRESSION: No acute changes. No evidence of mass or hemorrhage.    Old infarct right posterior frontal lobe.    These findings were called to the emergency room provider    NEDA FERNÁNDEZ MD          SYSTEM ID:  [id number]   CTA Head Neck with Contrast    Narrative    CTA HEAD NECK W CONTRAST    HISTORY: 83 years Male Code Stroke to evaluate for potential  thrombolysis and thrombectomy. PLEASE READ IMMEDIATELY.    COMPARISON: None    TECHNIQUE: Contrast-enhanced CT angiography of the neck and head was  performed with intervenous contrast. Multiplanar reconstructions and  MIP, volume rendered and 3-D MIP reconstructions were obtained on a  3-D workstation.  This exam was performed using one or more of the following dose  reduction techniques:  Automated exposure control, adjustment of the TAMI and/or KV according  to patient's size, and/or use of iterative reconstruction technique.    FINDINGS:    Right neck:            Brachiocephalic artery: patent            Common carotid artery:  patent            Internal carotid artery: There is calcified atherosclerotic  plaque with stenosis of less than 50% of the carotid bulb.            Vertebral artery:Widely patent                Left neck:            Common carotid artery: Widely patent            Internal carotid artery: There is calcified atherosclerotic  plaque with stenosis of less than 50%.            Vertebral artery:Widely patent      Head:                      Anterior circulation:The vessels are patent without evidence  of abrupt cut off. There is no evidence of aneurysm.           Posterior circulation:The vessels are patent. There is no  abrupt cut off. There is no evidence of aneurysm      Impression    IMPRESSION:No acute findings. There is no evidence of significant  carotid artery stenosis. There is no evidence of large vessel  occlusion or intracranial aneurysm.    NEDA FERNÁNDEZ MD         SYSTEM ID:  [id number]   CT Head Perfusion w Contrast - For Tier 2 Stroke    Narrative    EXAM: CT HEAD PERFUSION W CONTRAST 9/24/2024 9:10 AM    PROVIDED HISTORY: Code Stroke to evaluate for potential thrombolysis  and thrombectomy. Evaluate mismatch between  penumbra and core infarct.  READ IMMEDIATELY. Large vessel occlusion    COMPARISON: CT head and CTA head and neck 9/24/2024    TECHNIQUE:   CT BRAIN PERFUSION: Dynamic perfusion CT of the brain was performed at  multiple levels after administration of intravenous contrast; Image  processing was performed by RAPID AI for the generation of color rCBF,  rCBV, Tmax, and MTT.     CONTRAST: ISOVUE 370  50mL    FINDINGS:     CT Perfusion:   Images documenting relative cerebral blood volume, cerebral blood flow  and mean transit time demonstrate no evidence of acute infarction.  There is abnormal perfusion in the right frontal opercular region  where there is a 7 mL volume of tissue demonstrating CBF of less than  30% and 5 mL volume of demonstrating Tmax greater than 6 seconds.  Correlating with CT head, findings are consistent with chronic area of  hypoperfusion secondary to prior infarct.      Impression    IMPRESSION:     No evidence of acute infarction on the CT Perfusion examination.    Chronic right frontal opercular infarct.    [Result: No acute abnormality on CT head perfusion]    Finding was identified on 9/24/2024 9:26 AM.     Provider ANNABEL ASENCIO discussed results over the phone with Dr. Pleitez at 9/24/2024 9:32 AM and verbalized understanding of the  result.     PAMELA PLEITEZ MD         SYSTEM ID:  [id number]   MR Brain w/o & w Contrast    Narrative    EXAM: MR BRAIN W/O & W CONTRAST    HISTORY: New stroke, right sided deficits, woke up this morning.    TECHNIQUE: Multiplanar, multisequence MR imaging of the head obtained  prior to, and after, intravenous contrast administration    MEDS/CONTRAST: Gadavist   7.5 mL    COMPARISON: CT head, CTA head and neck and CT perfusion 9/24/2024     FINDINGS:      There is no mass effect, midline shift, or evidence of acute  intracranial hemorrhage. Diffusion weighted images demonstrate small  small subcortical acute infarct in the right jayson-Rolandic region. No  other area  of abnormal diffusion restriction. The ventricles are  proportionate to the cerebral sulci. Scattered foci of T2 hyperintense  FLAIR signal in the periventricular and subcortical white matter,  which is nonspecific, likely related to chronic small vessel ischemic  disease given the patient's age. Right frontal opercular region  encephalomalacia, consistent with prior MCA territory infarct. Normal  parenchymal volume for age. Normal major intracranial vascular flow  voids.    Postcontrast images demonstrate no abnormal intracranial enhancement.    No suspicious abnormality of the skull marrow signal. Mild paranasal  sinus mucosal thickening. Trace mastoid fluid bilaterally. Bilateral  pseudophakia.      Impression    IMPRESSION:    Small acute subcortical infarct in the right perirolandic region.    [Result: Small right frontal acute subcortical infarct]    Dr. Pleitez discussed results over the phone at 9/24/2024 11:13 AM with  inpatient floor nurse provider who verbalized understanding of the  result.    PAMELA PLEITEZ MD         SYSTEM ID:  [id number]

## 2024-09-24 NOTE — PLAN OF CARE
Goal Outcome Evaluation:  Thi writer was unable to do a full skin assessment due to patient refusal to remove the scrubs due to dizziness. Full skin assessment deferred to next shift.

## 2024-09-24 NOTE — CONSULTS
"  [facility]    Stroke Telephone Note    I was called by Ronnie Long on 09/24/24 regarding patient Kem Abdullahi. The patient is a 83 year old male with past medical history significant for CAD, HLD, HTN. They presented to the ED for evaluation of RIGHT facial weakness and impaired gait that were present on waking. LKW 2200 yesterday. Per provider, he has right lower facial droop on exam. Per EMS report, impaired gait may be due to RLE weakness.     Vitals  BP: (!) 217/101   Pulse: 83   Resp: 20   Temp: 97.4  F (36.3  C)   Weight: 64.6 kg (142 lb 6.7 oz)    Stroke Code Data (for stroke code without tele)  Stroke code activated 09/24/24  0847   Stroke provider first response 09/24/24  0849   Last known normal 09/23/24  2200      Time of discovery (or onset of symptoms) 09/24/24  0700   Head CT read by Stroke Neuro Provider 09/24/24  0853   Was stroke code de-escalated? Yes  09/24/24  0911     Imaging Findings  CT head: chronic R MCA stroke, otherwise negative for acute pathology  CTA head/neck: multifocal intracranial stenoses, including severe R PCA stenosis     Intravenous Thrombolysis  Not given due to:   - unclear or unfavorable risk-benefit profile for extended window thrombolysis beyond the conventional 4.5 hour time window    Endovascular Treatment  Not initiated due to absence of proximal vessel occlusion    Impression  1. Acute ischemic stroke of left hemisphere due to undetermined etiology   2. Multifocal intracranial stenoses  3. Chronic R MCA territory infarct    Recommendations   - Use orderset: \"Ischemic Stroke Routine Admission\" or \"Ischemic Stroke No Thrombolytics/No Thrombectomy ICU Admission\"  - Place Neurology IP Stroke Consult order   - Neurochecks and Vital Signs every 4 hours   - Permissive HTN; goal SBP < 220 mmHg  -  mg TN x1, followed by daily aspirin 325 mg for secondary stroke prevention  - Plavix (clopidogrel) 300 mg PO loading dose x 1 after clearance by SLP given " "facial weakness  --- daily Plavix 75 mg starting tomorrow  - Statin: high intensity   - MRI Brain with and without contrast   - TTE (with Bubble Study if age 60 yrs or less)  - Telemetry, EKG  - Bedside Glucose Monitoring  - A1c, Lipid Panel, Troponin x 3  - PT/OT/SLP  - Stroke Education  - Euthermia, Euglycemia    Case discussed with vascular neurology attending Dr. Jimenez.    My recommendations are based on the information provided over the phone by Kem Abdullahi's in-person providers. They are not intended to replace the clinical judgment of his in-person providers. I was not requested to personally see or examine the patient at this time.     Sofiya Dai, CNP  Vascular Neurology    To page me or covering stroke neurology team member, click here: AMCOM  Choose \"On Call\" tab at top, then select \"NEUROLOGY/ALL SITES\" from middle drop-down box, press Enter, then look for \"stroke\" or \"telestroke\" for your site.    "

## 2024-09-24 NOTE — PROGRESS NOTES
09/24/24 1400   Appointment Info   Signing Clinician's Name / Credentials (PT) Anabel Padron DPT   Appointment Canceled Reason (PT) Family declined   Appointment Cancel Comments (PT) Pt sleeping and family asked us to let patient rest. Will attempt PT eval tomorrow

## 2024-09-24 NOTE — ED NOTES
Assisted pt up to wheelchair and changed into scrubs for MRI. Pt became more dizzy and nauseas. Pt had a small stool on the toilet, and did have a small amount of clear vomitus.

## 2024-09-24 NOTE — ED NOTES
Arrived to ER via [facility] with c/o right side facial droop, slurred speech, difficulty standing when getting out of bed this morning, weakness on right side, and numbness on right side of face. Patient reports no symptoms when he went to bed last night. 18G IV placed to RAC by [facility] PTA in ER. Dr. Long assessed patient in ER hallway upon arrival and then patient went directly to CT via EMS stretcher with stroke team.

## 2024-09-24 NOTE — PLAN OF CARE
Goal Outcome Evaluation:we have attempted to have patient get up out of bed to change his scrubs into a hospital gown, patient is refusing to change at this time due to severe dizziness, MD was updated.

## 2024-09-25 ENCOUNTER — DOCUMENTATION ONLY (OUTPATIENT)
Dept: ANTICOAGULATION | Facility: OTHER | Age: 84
End: 2024-09-25
Payer: COMMERCIAL

## 2024-09-25 ENCOUNTER — APPOINTMENT (OUTPATIENT)
Dept: PHYSICAL THERAPY | Facility: HOSPITAL | Age: 84
DRG: 066 | End: 2024-09-25
Attending: INTERNAL MEDICINE
Payer: MEDICARE

## 2024-09-25 ENCOUNTER — APPOINTMENT (OUTPATIENT)
Dept: OCCUPATIONAL THERAPY | Facility: HOSPITAL | Age: 84
DRG: 066 | End: 2024-09-25
Attending: INTERNAL MEDICINE
Payer: MEDICARE

## 2024-09-25 VITALS
HEIGHT: 69 IN | SYSTOLIC BLOOD PRESSURE: 141 MMHG | BODY MASS INDEX: 19.85 KG/M2 | OXYGEN SATURATION: 94 % | RESPIRATION RATE: 18 BRPM | DIASTOLIC BLOOD PRESSURE: 77 MMHG | WEIGHT: 134.04 LBS | HEART RATE: 61 BPM | TEMPERATURE: 97.2 F

## 2024-09-25 DIAGNOSIS — I48.0 PAROXYSMAL ATRIAL FIBRILLATION (H): Primary | ICD-10-CM

## 2024-09-25 DIAGNOSIS — I63.9 ACUTE ISCHEMIC STROKE (H): ICD-10-CM

## 2024-09-25 LAB
ANION GAP SERPL CALCULATED.3IONS-SCNC: 13 MMOL/L (ref 7–15)
BUN SERPL-MCNC: 17.5 MG/DL (ref 8–23)
CALCIUM SERPL-MCNC: 9.8 MG/DL (ref 8.8–10.4)
CHLORIDE SERPL-SCNC: 101 MMOL/L (ref 98–107)
CREAT SERPL-MCNC: 0.81 MG/DL (ref 0.67–1.17)
EGFRCR SERPLBLD CKD-EPI 2021: 89 ML/MIN/1.73M2
ERYTHROCYTE [DISTWIDTH] IN BLOOD BY AUTOMATED COUNT: 14.8 % (ref 10–15)
GLUCOSE SERPL-MCNC: 120 MG/DL (ref 70–99)
HCO3 SERPL-SCNC: 25 MMOL/L (ref 22–29)
HCT VFR BLD AUTO: 45.1 % (ref 40–53)
HGB BLD-MCNC: 14.9 G/DL (ref 13.3–17.7)
MCH RBC QN AUTO: 29.6 PG (ref 26.5–33)
MCHC RBC AUTO-ENTMCNC: 33 G/DL (ref 31.5–36.5)
MCV RBC AUTO: 90 FL (ref 78–100)
PLATELET # BLD AUTO: 219 10E3/UL (ref 150–450)
POTASSIUM SERPL-SCNC: 3.6 MMOL/L (ref 3.4–5.3)
RBC # BLD AUTO: 5.03 10E6/UL (ref 4.4–5.9)
SODIUM SERPL-SCNC: 139 MMOL/L (ref 135–145)
WBC # BLD AUTO: 6.7 10E3/UL (ref 4–11)

## 2024-09-25 PROCEDURE — 250N000013 HC RX MED GY IP 250 OP 250 PS 637: Performed by: INTERNAL MEDICINE

## 2024-09-25 PROCEDURE — 99232 SBSQ HOSP IP/OBS MODERATE 35: CPT | Performed by: INTERNAL MEDICINE

## 2024-09-25 PROCEDURE — 36415 COLL VENOUS BLD VENIPUNCTURE: CPT | Performed by: INTERNAL MEDICINE

## 2024-09-25 PROCEDURE — 999N000226 HC STATISTIC SLP IP EVAL DEFER

## 2024-09-25 PROCEDURE — 97165 OT EVAL LOW COMPLEX 30 MIN: CPT | Mod: GO

## 2024-09-25 PROCEDURE — 97161 PT EVAL LOW COMPLEX 20 MIN: CPT | Mod: GP | Performed by: PHYSICAL THERAPIST

## 2024-09-25 PROCEDURE — 85014 HEMATOCRIT: CPT | Performed by: INTERNAL MEDICINE

## 2024-09-25 PROCEDURE — 80048 BASIC METABOLIC PNL TOTAL CA: CPT | Performed by: INTERNAL MEDICINE

## 2024-09-25 RX ORDER — WARFARIN SODIUM 2.5 MG/1
5 TABLET ORAL
Status: DISCONTINUED | OUTPATIENT
Start: 2024-09-25 | End: 2024-09-25 | Stop reason: HOSPADM

## 2024-09-25 RX ORDER — ATORVASTATIN CALCIUM 20 MG/1
40 TABLET, FILM COATED ORAL EVERY EVENING
Status: DISCONTINUED | OUTPATIENT
Start: 2024-09-25 | End: 2024-09-25 | Stop reason: HOSPADM

## 2024-09-25 RX ORDER — ASPIRIN 81 MG/1
81 TABLET, CHEWABLE ORAL DAILY
Status: DISCONTINUED | OUTPATIENT
Start: 2024-09-26 | End: 2024-09-25

## 2024-09-25 RX ORDER — WARFARIN SODIUM 5 MG/1
TABLET ORAL
Status: SHIPPED
Start: 2024-09-26

## 2024-09-25 RX ORDER — WARFARIN SODIUM 5 MG/1
5 TABLET ORAL DAILY
Qty: 30 TABLET | Refills: 1 | Status: SHIPPED | OUTPATIENT
Start: 2024-09-25 | End: 2024-09-25

## 2024-09-25 RX ADMIN — ASPIRIN 325 MG: 325 TABLET ORAL at 07:43

## 2024-09-25 RX ADMIN — CLOPIDOGREL BISULFATE 75 MG: 75 TABLET ORAL at 07:43

## 2024-09-25 ASSESSMENT — ACTIVITIES OF DAILY LIVING (ADL)
ADLS_ACUITY_SCORE: 22
ADLS_ACUITY_SCORE: 24
ADLS_ACUITY_SCORE: 24
PREVIOUS_RESPONSIBILITIES: MEAL PREP;HOUSEKEEPING;LAUNDRY;SHOPPING;YARDWORK;MEDICATION MANAGEMENT;FINANCES;DRIVING
ADLS_ACUITY_SCORE: 24
ADLS_ACUITY_SCORE: 22
ADLS_ACUITY_SCORE: 24
ADLS_ACUITY_SCORE: 22
ADLS_ACUITY_SCORE: 24
ADLS_ACUITY_SCORE: 22
ADLS_ACUITY_SCORE: 24
ADLS_ACUITY_SCORE: 22
ADLS_ACUITY_SCORE: 24
ADLS_ACUITY_SCORE: 22

## 2024-09-25 NOTE — PHARMACY-CONSULT NOTE
Pharmacy Consult to evaluate for medication related stroke core measures    Kem Abdullahi, 83 year old male admitted for Acute Ischemic Stroke on 9/24/2024.    Thrombolytic was not given because of Time from onset contraindications    VTE Prophylaxis SCDs /PCDs placed on 9/24/24, as appropriate prior to end of hospital day 2.    Antithrombotic: aspirin and clopidogrel started on 9/24/24, as appropriate by end of hospital day 2. Continue antithrombotic therapy on discharge to meet quality measures, unless contraindicated.    Anticoagulation if history of A-fib/flutter: Patient does not have history of A-fib/flutter - anticoagulation not required for medication related stroke core measures.     LDL Cholesterol Calculated   Date Value Ref Range Status   09/24/2024 66 <100 mg/dL Final       Patient currently receiving Lipitor (atorvastatin) continue statin on discharge to meet quality measures, unless contraindicated.    Recommendations: None at this time    Thank you for the consult.    Barry Armendariz, [facility] 9/25/2024 8:37 AM

## 2024-09-25 NOTE — PROGRESS NOTES
09/25/24 1000   Appointment Info   Signing Clinician's Name / Credentials (OT) Randi Vergara OTR/L   Living Environment   People in Home spouse   Current Living Arrangements house   Home Accessibility stairs within home;stairs to enter home   Number of Stairs, Main Entrance 2   Stair Railings, Main Entrance none   Number of Stairs, Within Home, Primary greater than 10 stairs   Transportation Anticipated car, drives self;family or friend will provide   Living Environment Comments Home is 2 levels with a basement. Pt's bedroom and shower are on the second level. The shower is a tub/shower combo. Pt has grab bars for the shower, but they are not installed. No grab bars by the toilets.   Self-Care   Usual Activity Tolerance good   Current Activity Tolerance good   Equipment Currently Used at Home none   Fall history within last six months yes   Number of times patient has fallen within last six months 1   Activity/Exercise/Self-Care Comment Pt is independent with ADLs at baseline, does not use an AD. Pt had 1 fall yesterday.   Instrumental Activities of Daily Living (IADL)   Previous Responsibilities meal prep;housekeeping;laundry;shopping;yardwork;medication management;finances;driving   IADL Comments Pt and wife share IADLs   General Information   Onset of Illness/Injury or Date of Surgery 09/24/24   Referring Physician Dr. 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   Cognitive Status Examination   Orientation Status orientation to person, place and time   Affect/Mental Status (Cognitive) WNL   Follows Commands WNL   Visual Perception   Visual Impairment/Limitations WNL   Sensory   Sensory Quick Adds sensation intact   Pain Assessment   Patient Currently in Pain No    Posture   Posture not impaired   Range of Motion Comprehensive   General Range of Motion no range of motion deficits identified   Strength Comprehensive (MMT)   General Manual Muscle Testing (MMT) Assessment no strength deficits identified   Coordination   Upper Extremity Coordination No deficits were identified   Transfers   Transfers No deficits identified;sit-stand transfer;toilet transfer   Sit-Stand Transfer   Sit-Stand [place] (Transfers) supervision;modified independence   Toilet Transfer   Type (Toilet Transfer) sit-stand;stand-sit   [place] Level (Toilet Transfer) supervision;modified independence   Balance   Balance Assessment no deficits identified   Activities of Daily Living   BADL Assessment/Intervention lower body dressing   Lower Body Dressing Assessment/Training   [place] Level (Lower Body Dressing) doff;don;socks;independent   Clinical Impression   Criteria for Skilled Therapeutic Interventions Met (OT) Evaluation only;No problems identified which require skilled intervention   Clinical Decision Making Complexity (OT) problem focused assessment/low complexity   Risk & Benefits of therapy have been explained evaluation/treatment results reviewed;care plan/treatment goals reviewed;risks/benefits reviewed;current/potential barriers reviewed;participants voiced agreement with care plan;participants included;patient;spouse/significant other   Clinical Impression Comments OT evaluation completed. Pt is completing ADLs with SBA to Mod I/IND, no concerns. UB strength, coordination, vision, and cognition are all normal. Pt appears back to his baseline. No further skilled OT services are needed. Will complete order.   OT Total Evaluation Time   OT Eval, Low Complexity Minutes (96628) 15   OT Discharge Planning   OT Plan Discharge skilled OT services   OT Discharge Recommendation (DC Rec) home with assist  (from wife, if needed)   OT Rationale for DC Rec OT evaluation completed. Pt is  completing ADLs with SBA to Mod I/IND, no concerns. UB strength, coordination, vision, and cognition are all normal. Pt appears back to his baseline. No further skilled OT services are needed. Will complete order.   OT Brief overview of current status SBA to Mod I in ADLs   Total Session Time   Total Session Time (sum of timed and untimed services) 15

## 2024-09-25 NOTE — PROGRESS NOTES
Speech Language Pathology: Orders received. Chart reviewed and discussed with care team and patient. Pt and his wife denied pt experiencing any changes or concerns with speech/language or swallow function since admission. Pt is currently on a regular diet with thin liquids and tolerating independently. Provider reported that pt is at baseline and no additional concerns relating to SLP services are present.? Speech Language Pathology evaluation not indicated at this time.? Defer discharge recommendations to other care team members.? Will complete orders.

## 2024-09-25 NOTE — PROGRESS NOTES
Anticoag referral was accidentally sent to [facility] instead of [facility] antico team.     ANTICOAGULATION  MANAGEMENT    Kem Abdullahi is being discharged from the [facility] Anticoagulation Management Program (ACC).    Reason for discharge: Inpatient referral received, but Kem without [facility] ambulatory provider to prescribe/refer for management. Patient will be managed through [facility] antico    Anticoagulation episode resolved, ACC referral closed, and Standing order discontinued    If patient needs warfarin management in the future, please send a new referral    Gena Garcia RN

## 2024-09-25 NOTE — PROGRESS NOTES
09/25/24 1015   Appointment Info   Signing Clinician's Name / Credentials (PT) Gillian Wiley, DPT   Living Environment   People in Home significant other   Current Living Arrangements house   Home Accessibility stairs within home;stairs to enter home   Number of Stairs, Main Entrance 2   Stair Railings, Main Entrance none   Number of Stairs, Within Home, Primary greater than 10 stairs   Stair Railings, Within Home, Primary railings on both sides of stairs   Transportation Anticipated car, drives self   Living Environment Comments Pt's bedroom located on mainfloor of home, shower located in basement or second floor   Self-Care   Usual Activity Tolerance good   Current Activity Tolerance good   Equipment Currently Used at Home none   Fall history within last six months yes   Number of times patient has fallen within last six months 1   Activity/Exercise/Self-Care Comment Pt is independent with all ADLs at baseline, ambulates without AD.   General Information   Onset of Illness/Injury or Date of Surgery 09/24/24   Referring Physician Dr. Cole   Patient/Family Therapy Goals Statement (PT) to go home today   Pertinent History of Current Problem (include personal factors and/or comorbidities that impact the POC) Pt admitted with dizziness, facial droop and found to have acute CVA   Cognition   Affect/Mental Status (Cognition) WFL   Orientation Status (Cognition) oriented x 3   Follows Commands (Cognition) WFL   Pain Assessment   Patient Currently in Pain No   Integumentary/Edema   Integumentary/Edema no deficits were identifed   Posture    Posture Forward head position;Protracted shoulders   Range of Motion (ROM)   Range of Motion ROM is WFL   Strength (Manual Muscle Testing)   Strength (Manual Muscle Testing) strength is WFL   Bed Mobility   Bed Mobility no deficits identified   Transfers   Transfers no deficits identified   Gait/Stairs (Locomotion)   [place] Level (Gait) independent   Distance in Feet  (Gait) 500'   Pattern (Gait) step-through  (quick pace, multiple turns with no LOB)   Negotiation (Stairs) stairs independence;handrail location;number of steps;ascending technique;descending technique   [place] Level (Stairs) modified independence   Handrail Location (Stairs) both sides   Number of Steps (Stairs) 12   Ascending Technique (Stairs) step-over-step   Descending Technique (Stairs) step-over-step   Balance   Balance Comments good, no instability or LOB noted   Sensory Examination   Sensory Perception WFL   Clinical Impression   Criteria for Skilled Therapeutic Intervention Evaluation only;No problems identified which require skilled intervention   Clinical Presentation (PT Evaluation Complexity) stable   Clinical Presentation Rationale clinical judgement   Clinical Decision Making (Complexity) low complexity   Risk & Benefits of therapy have been explained evaluation/treatment results reviewed;patient;participants included;spouse/significant other   Clinical Impression Comments PT evaluation completed.  Pt independent at baseline and independent with all mobility without AD currently.  Pt has no identified PT needs at this time.  Pt is safe and appropriate to discharge home at this time.   PT Total Evaluation Time   PT Eval, Low Complexity Minutes (98003) 13   PT Discharge Planning   PT Plan No further skilled PT needs at this time   PT Discharge Recommendation (DC Rec) home   PT Rationale for DC Rec PT evaluation completed. Pt independent at baseline and independent with all mobility without AD currently. Pt has no identified PT needs at this time. Pt is safe and appropriate to discharge home at this time.   PT Brief overview of current status sit<>stand independent, ambulate 500' without AD I, 12 steps with bilateral rails mod I.   Total Session Time   Total Session Time (sum of timed and untimed services) 13

## 2024-09-25 NOTE — DISCHARGE SUMMARY
Patient discharged at 3:42 PM via wheel chair accompanied by significant other and staff. Prescriptions sent to patients preferred pharmacy. All belongings sent with patient.     Discharge instructions reviewed with Patient. Listed belongings gathered and returned to patient. Clothing    Patient discharged to Home.     Did patient receive discharge instruction on wound care and recognition of infection symptoms? Yes    MISC  Follow up appointment made:  Yes  Home medications returned to patient: N/A  Patient reports pain was well managed at discharge: Yes    Patient is A/O X4 and shows no weakness or disorientation. Pt. Has taken Warfarin before and was reviewed. Pt was also made aware of his next two follow up appointments.    Joy Rivera  9/25/2024  3:51 PM

## 2024-09-25 NOTE — PROGRESS NOTES
Care Transitions focused note:      Checked in with Kem and significant other, Kori.  Updated demographics/PCP.  No questions or concerns noted in regards to discharging home.      Medical record and Yan Score reviewed. Participated in interdisciplinary rounds.  No apparent needs noted at this time. Care Transitions will remain available if needs arise.

## 2024-09-25 NOTE — DISCHARGE INSTRUCTIONS
Appointments: You have a Lab appointment scheduled for Friday September 27th at 10:00AM for an INR check at [facility].    Appointments: * You have a Hospital Follow-up appointment scheduled for Monday September 30th at 12:30PM with Dr. Brice at [facility] If you need to reschedule please call [phone]

## 2024-09-25 NOTE — PHARMACY-ANTICOAGULATION SERVICE
Pharmacy Consult-Warfarin Assessment Day #1    Kem Abdullahi is a 83 year old male admitted on 9/24/2024 with <principal problem not specified>    Primary Indication(s) for Anticoagulation: Ischemic Stroke    Goal INR: 2-3    FYI, patient is followed by the [facility]tico[facility] at: New warfarin patient to be followed by [facility] tablet strength(s): 5 mg    Factors that may increase patient's bleeding risk and/or sensitivity to warfarin (Coumadin) include: Advanced age    Factors that may decrease patient's bleeding risk and/or sensitivity to warfarin (Coumadin) include: none      Anticoagulation Dose History          Latest Ref Rng & Units 9/24/2024   Recent Dosing and Labs   INR 0.85 - 1.15 1.15       Details                 CBC RESULTS:   Recent Labs   Lab Test 09/25/24  0510   HGB 14.9          Assessment/Plan: Patient is being discharged today. He is being given warfarin 5 mg today (9/25/24) prior to discharge    Take 5mg (1 tab) 9/26/24;  Recheck INR on 9/27/24 and receive additional dosing instructions per Anticoagulation Clinic.    Thank You for the Consult. Will continue to follow.    Barry Armendariz [facility] ....................  9/25/2024   3:05 PM

## 2024-09-25 NOTE — PROGRESS NOTES
[facility]    Hospitalist Progress Note       Kem Abdullahi is a 83 year old male who presented the emergency room after awakening this morning noticed some right facial, numbness some dizziness.  He got up out of bed and fell to the floor did not pass out.  Just felt very weak.  His wife came and helped him back into bed.  She noticed that his speech was maybe slightly slurred did have some right facial droop.  He had no chest pains or palpitations.  Some mild nausea.  They called 911 is brought to the ER for evaluation.  Upon arrival to the ER his blood pressure was 217/101 pulse is 83 and regular sats are 98% on room air he was afebrile.  Per report he was noted to have a right-sided facial droop.  He was sent to the CT scanner immediately for a code stroke.  Did not show anything acute on the CT scan did show an old posterior frontal lobe area of encephalomalacia.  Stroke neurology saw the patient did not feel that he was appropriate for any type of thrombolytic intervention.  He went to bed last night feeling well.  They actually had been on a trip to [place] for the last week and flew home the other day.  He had no issues while he was there.  And no issues when he went to bed last night.  He is usually up ambulatory without any specific issues and complaints.  Did have some dyspnea but he has a diagnosis of COPD.  No specific chest pains or palpitations.  He has had no bleeding troubles at all and no recent cold or flu bugs.  Saw his cardiologist I think last week or so.  They discontinued his Coreg cut back on his atorvastatin.  Continue with the losartan.  Will going to set him up for a stress test just to check him out.  Does have a previous history of stenting back in 2001.     In the ER he was given aspirin.  Stroke neurology recommended admission with routine stroke evaluation.  Echo telemetry lipids etc.  Was also loaded on 300 mg of Plavix.      Assessment & Plan    Kem Abdullahi is a 83  year old male admitted on 9/24/2024. He presented to the emergency room with onset of right facial numbness gait abnormality upon awaking this morning     1.  Acute ischemic stroke: Patient presented with symptoms of slightly slurred speech some right-sided facial numbness possible droop and some gait abnormalities.  He went to bed last evening asymptomatic.  Has been having no issues in fact just returned from a trip to [place].  He came to the ER for immediate valuation.  CT showed nothing acute.  He was seen by stroke neurology.  They recommended aspirin Plavix.  MRI was subsequently performed it did show a very small right acute subcortical infarct in the perirolandic region.  He does have a more chronic appearing right MCA stroke with encephalomalacia.  Patient was not felt to be candidate for intervention.  The CT angiogram did not show any significant stenosis.  No current evidence of atrial fibrillation.  Does have some ongoing dizziness.  He is getting the load of aspirin Plavix.  Will have an echocardiogram performed.  No obvious dysphagia now.  All of his symptoms and exam findings have basically resolved.  Will be reevaluated by stroke neurology.  -9/25: Alert pleasant really no distress he feels completely normal at this point.  Exam is unremarkable.  His dizziness has resolved.  He had no dysrhythmias overnight.  Unfortunately we do not have echo capabilities today.  This will have to be set up as an outpatient.  Will await stroke neurology reevaluation.  But anticipate discharge home after their visit.  The issue of his symptoms versus the site of his small stroke are a bit confusing to me.  Did have right sided issues.  The small stroke was noted in the right subcortical area.  Nothing was seen on the left or anything else in the midbrain.     2.  Coronary artery disease: Last interventions were back in 2021 where he had intervention on a chronically occluded right coronary artery.  His EF from that  time was 35 to 40%.  No history of any atrial fibrillation.  He is going to have a scheduled outpatient stress test in the near future by his cardiology although he is having no current symptoms.  Will let him have passive hypertension.  Will not aggressively intervene at this point.  Pressures were quite high when he came in but they have already come down to 150 systolic range.  -9/25: Hemodynamically stable.  No chest pains.  He recently saw his cardiologist Dr. Manzo on September 18.  He was to be set up for an outpatient stress test just to assess his current coronary artery status as he does have some dyspnea on exertion.  Recently diagnosed with a COPD really just want to try and sort this all out.  Will set up for this and I think on this Thursday.  Would recommend that we just postpone this until he recovers from his current episode.  Also does have a history of some paroxysmal atrial fibrillation.  He has not been interested in starting any anticoagulation.  His last echocardiogram was actually in 2023 showed an EF of 50 to 55% with mild aortic stenosis.  We will plan on setting up an outpatient echocardiogram.     3.  COPD: Was diagnosed with this recently.  Does have inhaler as needed.  No evidence of any acute exacerbation.  Sats are 93% on room air.  No significant complaints of dyspnea     4.  Dizziness: Complains of just some dizziness.  Some mild nausea.  Just flew back from [place] this may be part of the problem.  His TMs look fine.  Could be from his stroke also it is difficult to sort out.  Does have some antiemetics ordered.  Will give him a dose of some low-dose diazepam at this time.  No evidence of any cerebellar or posterior stroke involvement.  -9/25: Dizziness has resolved.  He is able to get up and ambulate without any difficulty at all.    Diet: Regular Diet Adult  Fluids: None    DVT Prophylaxis: Pneumatic Compression Devices  Code Status: Full Code    Disposition: Expected  discharge later today after evaluation by stroke neurology  Lee Cole MD    Interval History   Alert pleasant no distress feels back to normal.  No dizziness.  No chest pains hemodynamically stable.  No dysrhythmias overnight.  Unfortunately no echo was obtainable today.  Will be evaluated by stroke neurology anticipate discharge home with outpatient echocardiogram.    -Data reviewed today: I reviewed all new labs and imaging results over the last 24 hours. I personally reviewed no images or EKG's today.    Physical Exam   Temp: 97.9  F (36.6  C) Temp src: Tympanic BP: 144/70 Pulse: 83   Resp: 16 SpO2: 92 % O2 Device: None (Room air) Oxygen Delivery: 1.5 LPM  Vitals:    09/24/24 0910 09/24/24 1600   Weight: 64.6 kg (142 lb 6.7 oz) 60.8 kg (134 lb 0.6 oz)     Vital Signs with Ranges  Temp:  [95.5  F (35.3  C)-98  F (36.7  C)] 97.9  F (36.6  C)  Pulse:  [56-83] 83  Resp:  [10-28] 16  BP: (139-217)/() 144/70  SpO2:  [90 %-98 %] 92 %  I/O last 3 completed shifts:  In: 3 [I.V.:3]  Out: -     Peripheral IV 09/24/24 Anterior;Distal;Right Upper arm (Active)   Site Assessment WDL 09/25/24 0000   Dressing Transparent 09/25/24 0000   Dressing Status clean;dry;intact 09/25/24 0000   Phlebitis Scale 0-->no symptoms 09/25/24 0000   Infiltration? no 09/25/24 0000   Number of days: 1     No line/device    Constitutional: Alert and oriented x3. No distress    HEENT: Normocephalic/atraumatic, PERRL, EOMI, mouth moist, neck supple, no LN.     Cardiovascular: RRR.  No murmur, no  rubs, or gallops.  JVD no.  Bruits no  .  Pulses 2+    Respiratory: Clear to auscultation bilaterally    Abdomen: Soft, nontender, nondistended, no organomegaly. Bowel sounds present    Extremities: Warm/dry.  No edema    Neuro:   Non focal, cranial nerves intact, Moves all extremities.  Medications   Current Facility-Administered Medications   Medication Dose Route Frequency Provider Last Rate Last Admin     Current Facility-Administered  Medications   Medication Dose Route Frequency Provider Last Rate Last Admin    aspirin (ASA) EC tablet 325 mg  325 mg Oral Daily Lee Cole MD        Or    aspirin (ASA) chewable tablet 324 mg  324 mg Oral or NG Tube Daily Lee Cole MD        Or    aspirin (ASA) Suppository 300 mg  300 mg Rectal Daily Lee Cole MD        atorvastatin (LIPITOR) tablet 80 mg  80 mg Oral QPM Lee Cole MD   80 mg at 09/24/24 2055    clopidogrel (PLAVIX) tablet 75 mg  75 mg Oral or NG Tube Daily Lee Cole MD        [Held by provider] losartan (COZAAR) tablet 50 mg  50 mg Oral Daily Lee Cole MD        sodium chloride (PF) 0.9% PF flush 3 mL  3 mL Intracatheter Q8H Lee Cole MD   3 mL at 09/24/24 2056       Data   Recent Labs   Lab 09/25/24  0510 09/24/24  0901   WBC 6.7 4.0   HGB 14.9 14.0   MCV 90 90    193   INR  --  1.15    137   POTASSIUM 3.6 3.7   CHLORIDE 101 102   CO2 25 24   BUN 17.5 18.3   CR 0.81 0.83   ANIONGAP 13 11   JANEEN 9.8 8.7*   * 117*       Recent Results (from the past 24 hour(s))   CT Head w/o Contrast    Narrative    Exam: CT HEAD W/O CONTRAST    Clinical history:83 years Male Code Stroke to evaluate for potential  thrombolysis and thrombectomy. PLEASE READ IMMEDIATELY.  . Patient  woke up with right-sided facial droop and vertigo.    Comparisons:None    Technique: Axial CT imaging of the head was performed without  intervenous contrast.  This exam was performed using one or more of the following dose  reduction techniques:  Automated exposure control, adjustment of the TAMI and/or KV according  to patient's size, and/or use of iterative reconstruction technique.    FINDINGS: There is a hypodensity compatible with encephalomalacia  involving the posterior right frontal lobe. This is likely sequela of  an old infarct.   Ventricles and sulci are symmetric. The gray-white matter  differentiation throughout the brain is otherwise well  maintained.  There is no evidence of intracranial mass or hemorrhage. Visualized  portions of the paranasal sinuses and mastoid air cells are well  aerated.   There is no evidence of skull fracture.      Impression    IMPRESSION: No acute changes. No evidence of mass or hemorrhage.    Old infarct right posterior frontal lobe.    These findings were called to the emergency room provider    NEDA FERNÁNDEZ MD         SYSTEM ID:  [id number]   CTA Head Neck with Contrast    Narrative    CTA HEAD NECK W CONTRAST    HISTORY: 83 years Male Code Stroke to evaluate for potential  thrombolysis and thrombectomy. PLEASE READ IMMEDIATELY.    COMPARISON: None    TECHNIQUE: Contrast-enhanced CT angiography of the neck and head was  performed with intervenous contrast. Multiplanar reconstructions and  MIP, volume rendered and 3-D MIP reconstructions were obtained on a  3-D workstation.  This exam was performed using one or more of the following dose  reduction techniques:  Automated exposure control, adjustment of the TAMI and/or KV according  to patient's size, and/or use of iterative reconstruction technique.    FINDINGS:    Right neck:            Brachiocephalic artery: patent            Common carotid artery:  patent            Internal carotid artery: There is calcified atherosclerotic  plaque with stenosis of less than 50% of the carotid bulb.            Vertebral artery:Widely patent                Left neck:            Common carotid artery: Widely patent            Internal carotid artery: There is calcified atherosclerotic  plaque with stenosis of less than 50%.            Vertebral artery:Widely patent      Head:                      Anterior circulation:The vessels are patent without evidence  of abrupt cut off. There is no evidence of aneurysm.           Posterior circulation:The vessels are patent. There is no  abrupt cut off. There is no evidence of aneurysm      Impression    IMPRESSION:No acute findings. There is no  evidence of significant  carotid artery stenosis. There is no evidence of large vessel  occlusion or intracranial aneurysm.    NEDA FERNÁNDEZ MD         SYSTEM ID:  [id number]   CT Head Perfusion w Contrast - For Tier 2 Stroke    Narrative    EXAM: CT HEAD PERFUSION W CONTRAST 9/24/2024 9:10 AM    PROVIDED HISTORY: Code Stroke to evaluate for potential thrombolysis  and thrombectomy. Evaluate mismatch between penumbra and core infarct.  READ IMMEDIATELY. Large vessel occlusion    COMPARISON: CT head and CTA head and neck 9/24/2024    TECHNIQUE:   CT BRAIN PERFUSION: Dynamic perfusion CT of the brain was performed at  multiple levels after administration of intravenous contrast; Image  processing was performed by [employer] for the generation of color rCBF,  rCBV, Tmax, and MTT.     CONTRAST: ISOVUE 370  50mL    FINDINGS:     CT Perfusion:   Images documenting relative cerebral blood volume, cerebral blood flow  and mean transit time demonstrate no evidence of acute infarction.  There is abnormal perfusion in the right frontal opercular region  where there is a 7 mL volume of tissue demonstrating CBF of less than  30% and 5 mL volume of demonstrating Tmax greater than 6 seconds.  Correlating with CT head, findings are consistent with chronic area of  hypoperfusion secondary to prior infarct.      Impression    IMPRESSION:     No evidence of acute infarction on the CT Perfusion examination.    Chronic right frontal opercular infarct.    [Result: No acute abnormality on CT head perfusion]    Finding was identified on 9/24/2024 9:26 AM.     Provider ANNABEL ASENCIO discussed results over the phone with Dr. Lea at 9/24/2024 9:32 AM and verbalized understanding of the  result.     PAMELA LEA MD         SYSTEM ID:  [id number]   MR Brain w/o & w Contrast    Narrative    EXAM: MR BRAIN W/O & W CONTRAST    HISTORY: New stroke, right sided deficits, woke up this morning.    TECHNIQUE: Multiplanar, multisequence MR imaging of  the head obtained  prior to, and after, intravenous contrast administration    MEDS/CONTRAST: Gadavist   7.5 mL    COMPARISON: CT head, CTA head and neck and CT perfusion 9/24/2024     FINDINGS:      There is no mass effect, midline shift, or evidence of acute  intracranial hemorrhage. Diffusion weighted images demonstrate small  small subcortical acute infarct in the right jayson-Rolandic region. No  other area of abnormal diffusion restriction. The ventricles are  proportionate to the cerebral sulci. Scattered foci of T2 hyperintense  FLAIR signal in the periventricular and subcortical white matter,  which is nonspecific, likely related to chronic small vessel ischemic  disease given the patient's age. Right frontal opercular region  encephalomalacia, consistent with prior MCA territory infarct. Normal  parenchymal volume for age. Normal major intracranial vascular flow  voids.    Postcontrast images demonstrate no abnormal intracranial enhancement.    No suspicious abnormality of the skull marrow signal. Mild paranasal  sinus mucosal thickening. Trace mastoid fluid bilaterally. Bilateral  pseudophakia.      Impression    IMPRESSION:    Small acute subcortical infarct in the right perirolandic region.    [Result: Small right frontal acute subcortical infarct]    Dr. Pleitez discussed results over the phone at 9/24/2024 11:13 AM with  inpatient floor nurse provider who verbalized understanding of the  result.    PAMELA PLEITEZ MD         SYSTEM ID:  [id number]

## 2024-09-25 NOTE — PROGRESS NOTES
Called [employer] [facility] in regards to Eliquis coverage.  Augustus indicated that Kem has a deductible of $422 to meet prior to coverage kicking in.  He indicates if filled, Kem would be responsible for $525 after deductible met responsible for about $42.  Updated Kem and Kori.  Kem indicates that he doesn't anticipate he could afford that d/t limited income.  Dr. Cole updated and will speak with Kem about other options.

## 2024-09-25 NOTE — PLAN OF CARE
Took over care of this pt at 1915. Assess as charted. Denies dizzyness. Speech clear. Denies facial numbness. Did get up and out of bed without first calling for assist. Bed alarm now on.     Face to face report given with opportunity to observe patient.    Report given to Traci Torres RN   9/24/2024  10:56 PM

## 2024-09-25 NOTE — PLAN OF CARE
"Plan of Care Reviewed With: patient    Overall Patient Progress: improving      Patient is alert and oriented, makes needs known. Up to bathroom with SBA. Neuro checks per flowsheets, no facial droop noted, no weakness reported or noted. Patient denies pain. Assessment as charted. /96 (BP Location: Left arm, Cuff Size: Adult Regular)   Pulse 83   Temp 98  F (36.7  C) (Tympanic)   Resp 16   Ht 1.74 m (5' 8.5\")   Wt 60.8 kg (134 lb 0.6 oz)   SpO2 (!) 90%   BMI 20.08 kg/m      Face to face report given with opportunity to observe patient.    Report given to Cinthia LYNN RN and AMAYA Romero RN   9/25/2024  7:06 AM      "

## 2024-09-25 NOTE — CONSULTS
[facility]    Stroke Consult Note    Reason for Consult:  stroke     Chief Complaint: Stroke Symptoms       HPI  Kem Abdullahi is a 83 year old male  with past medical history significant for CAD, HLD, HTN. Atrial fibrillation (not on AC).  He presented to the ED 9/24/2024 for evaluation of dizziness and right facial weakness. He reports that he was in bed and noticed room-spinning dizziness immediately upon waking. He tried to get up and fell to the ground.  He notived a thick tongue and some numbness and drooping of the right face. Right facial symptoms only lasted an hour, the dizziness was persistent throughout the day and resolved last evening. Has had vertigo in the past and this episode felt the same.     MRI brain demonstrated acute infarcts in the RIGHT perirolandic region. Today on exam, he reports feeling at baseline. We discussed that his right hemisphere stroke doesn't explain his right facial symptoms or vertigo. It is possible that he had an embolic shower given his history of atrial fibrillation. Discussed the risks/benefits of anticoagulation and he is agreeable. Discussed plan with hospitalist.     Stroke Evaluation Summarized    MRI/Head CT Small acute subcortical infarct in the right perirolandic region. Right frontal opercular region  encephalomalacia, consistent with prior MCA territory infarct.   Intracranial Vasculature No significant stenosis   Cervical Vasculature Calcified atherosclerotic plaque with stenosis of less than 50% of the carotid bulb bilaterally     Echocardiogram PENDING   EKG/Telemetry sinus   Other Testing Not Applicable      LDL  9/24/2024: 66 mg/dL   A1C  9/24/2024: 5.7 %   Troponin 9/24/2024: 22 ng/L       Impression  Acute ischemic stroke of right perirolandic region due to cardioembolism in the setting of atrial fibrillation, not on anticoagulation.  This infarct would not explain his right facial symptoms or vertiginous symptoms.  It is possible that  "he had an embolic shower which resulted in transient right facial weakness. There is also evidence of chronic right MCA territory infarct.    Recommendations   Secondary Stroke Prevention  - Recommend initiation of anticoagulation for secondary stroke prevention.  DOAC preferred from a stroke perspective if not cost prohibitive.  - LDL 66, on Lipitor 40 mg daily PTA.  Continue with  goal LDL 40-70  - A1c 5.7, within goal <7.0  -Goal SBP <180 while inpatient, then long term goal BP <130/80 with tighter control associated with decreased overall CV risk, if tolerated. Discussed the importance of home BP monitoring and keeping a log for PCP.  - PT/OT/SLP  - Stroke education. Discussed stroke warning signs (BE FAST) and need for emergent presentation if symptoms occur    Diagnostic Evaluation  - TTE pending, not able to be completed today    Patient Follow-up    - With PCP in 1 to 2 weeks  - With local neurologist of patient's preference in 6 to 8 weeks    Thank you for this consult.  We will follow peripherally for results of TTE.    Sofiya Dai, CNP  Vascular Neurology    To page me or covering stroke neurology team member, click here: [facility]  Choose \"On Call\" tab at top, then select \"NEUROLOGY/ALL SITES\" from middle drop-down box, press Enter, then look for \"stroke\" or \"telestroke\" for your site.  _____________________________________________________    Clinically Significant Risk Factors                                         Past Medical History    Past Medical History:   Diagnosis Date    Coronary artery disease     ED (erectile dysfunction)     Hyperlipidemia     Hypertension     Stented coronary artery 2006     Medications   Home Meds  Prior to Admission medications    Medication Sig Start Date End Date Taking? Authorizing Provider   albuterol (PROAIR HFA/PROVENTIL HFA/VENTOLIN HFA) 108 (90 Base) MCG/ACT inhaler Inhale 2 puffs into the lungs every 4 hours as needed. 3/14/24  Yes Reported, Patient   aspirin " (ASA) 325 MG tablet Take 162.5 mg by mouth daily.   Yes Reported, Patient   atorvastatin (LIPITOR) 80 MG tablet Take 40 mg by mouth every evening. 6/6/24  Yes Reported, Patient   cetirizine (ZYRTEC) 10 MG tablet Take 10 mg by mouth daily as needed (nasal drainage).   Yes Reported, Patient   ibuprofen (ADVIL/MOTRIN) 200 MG tablet Take 200 mg by mouth daily as needed (neck soreness).   Yes Reported, Patient   levalbuterol (XOPENEX HFA) 45 MCG/ACT inhaler Inhale 2 puffs into the lungs every 4 hours as needed for shortness of breath or wheezing 10/12/23  Yes Flynn Mera MD   losartan (COZAAR) 50 MG tablet Take 50 mg by mouth every morning. 8/13/24  Yes Reported, Patient   nitroGLYcerin (NITROSTAT) 0.4 MG sublingual tablet Place 0.4 mg under the tongue every 5 minutes as needed for chest pain. -for 3 doses. If symptoms persist 5 minutes after 1st dose call 911.   Yes Reported, Patient   sildenafil (VIAGRA) 100 MG tablet Take 100 mg by mouth daily as needed (sexual activity). : administer 30 minutes to 4 hours before activity. Do not use with nitroglycerin. 8/20/24  Yes Reported, Patient   zinc 50 MG TABS Take 1 tablet by mouth daily   Yes Reported, Patient       Scheduled Meds  Current Facility-Administered Medications   Medication Dose Route Frequency Provider Last Rate Last Admin    atorvastatin (LIPITOR) tablet 40 mg  40 mg Oral QPM Sofiya Dai, CNP        [Held by provider] losartan (COZAAR) tablet 50 mg  50 mg Oral Daily Lee Cole MD        sodium chloride (PF) 0.9% PF flush 3 mL  3 mL Intracatheter Q8H Lee Cole MD   3 mL at 09/25/24 0744    warfarin ANTICOAGULANT (COUMADIN) tablet 5 mg  5 mg Oral ONCE at 18:00 Lee Cole MD           Infusion Meds  Current Facility-Administered Medications   Medication Dose Route Frequency Provider Last Rate Last Admin       Allergies   No Known Allergies       PHYSICAL EXAMINATION   Temp:  [79.9  F (26.6  C)-98  F (36.7  C)] 97.2  F (36.2   C)  Pulse:  [61-83] 61  Resp:  [16-20] 18  BP: (119-162)/(63-96) 141/77  SpO2:  [90 %-95 %] 94 %    Neuro Exam  Mental Status:  alert, oriented x 3, follows commands, speech clear and fluent, naming and repetition normal  Cranial Nerves:  visual fields intact (tested by nurse), EOMI with normal smooth pursuit, facial sensation intact and symmetric (tested by nurse), facial movements symmetric, hearing not formally tested but intact to conversation, no dysarthria, shoulder shrug equal bilaterally, tongue protrusion midline  Motor:  no abnormal movements, able to move all limbs antigravity spontaneously with no signs of hemiparesis observed, no pronator drift  Reflexes:  unable to test (telestroke)  Sensory:  light touch sensation intact and symmetric throughout upper and lower extremities (assessed by nurse), no extinction on double simultaneous stimulation (assessed by nurse)  Coordination:  normal finger-to-nose and heel-to-shin bilaterally without dysmetria  Station/Gait:  unable to test due to telestroke    Stroke Scales  [employer] Stroke Scale     Score    Level of consciousness: (0)   Alert, keenly responsive    LOC questions: (0)   Answers both questions correctly    LOC commands: (0)   Performs both tasks correctly    Best gaze: (0)   Normal    Visual: (0)   No visual loss    Facial palsy: (0)   Normal symmetrical movements    Motor arm (left): (0)   No drift    Motor arm (right): (0)   No drift    Motor leg (left): (0)   No drift    Motor leg (right): (0)   No drift    Limb ataxia: (0)   Absent    Sensory: (0)   Normal- no sensory loss    Best language: (0)   Normal- no aphasia    Dysarthria: (0)   Normal    Extinction and inattention: (0)   No abnormality        Total Score:  0        Imaging  I personally reviewed all imaging; relevant findings per HPI.    Labs Data   CBC  Recent Labs   Lab 09/25/24  0510 09/24/24  0901   WBC 6.7 4.0   RBC 5.03 4.69   HGB 14.9 14.0   HCT 45.1 42.4  "   193     Basic Metabolic Panel   Recent Labs   Lab 09/25/24  0510 09/24/24  0901    137   POTASSIUM 3.6 3.7   CHLORIDE 101 102   CO2 25 24   BUN 17.5 18.3   CR 0.81 0.83   * 117*   JANEEN 9.8 8.7*     Liver Panel  No results for input(s): \"PROTTOTAL\", \"ALBUMIN\", \"BILITOTAL\", \"ALKPHOS\", \"AST\", \"ALT\", \"BILIDIRECT\" in the last 168 hours.  INR    Recent Labs   Lab Test 09/24/24  0901   INR 1.15           Stroke Consult Data Data   Telestroke Service Details  (for non-emergent stroke consult with tele)  Video start time 09/25/24   1145   Video end time 09/25/24   1220   Type of service telemedicine diagnostic assessment of acute neurological changes   Reason telemedicine is appropriate patient requires assessment with a specialist for diagnosis and treatment of neurological symptoms   Mode of transmission secure interactive audio and video communication per Jus   Originating site (patient location) [facility]    Distant site (provider location) [facility]       I have personally spent a total of 50 minutes providing care today, time spent in reviewing medical records and devising the plan as recorded above.    "

## 2024-10-28 NOTE — DISCHARGE SUMMARY
[facility]  Hospitalist Discharge Summary      Date of Admission:  9/24/2024  Date of Discharge:  9/25/2024  3:42 PM  Discharging Provider: Lee Cole MD  Discharge Service: Hospitalist Service    Discharge Diagnoses     Acute ischemic stroke  History paroxysmal atrial fibrillation  COPD  Coronary artery disease    Clinically Significant Risk Factors          Follow-ups Needed After Discharge   Follow-up Appointments       Follow-up and recommended labs and tests       Follow up with primary care provider, Katlyn Brice, within 7 days for hospital follow- up.  No follow up labs or test are needed.    Follow-up with the [facility] Coumadin clinic tomorrow.            None    Unresulted Labs Ordered in the Past 30 Days of this Admission       No orders found from 8/25/2024 to 9/25/2024.        These results will be followed up by not needed    Discharge Disposition   Discharged to home  Condition at discharge: Stable    Hospital Course     Kem Abdullahi is a 83 year old male who presented the emergency room after awakening this morning noticed some right facial, numbness some dizziness.  He got up out of bed and fell to the floor did not pass out.  Just felt very weak.  His wife came and helped him back into bed.  She noticed that his speech was maybe slightly slurred did have some right facial droop.  He had no chest pains or palpitations.  Some mild nausea.  They called 911 is brought to the ER for evaluation.  Upon arrival to the ER his blood pressure was 217/101 pulse is 83 and regular sats are 98% on room air he was afebrile.  Per report he was noted to have a right-sided facial droop.  He was sent to the CT scanner immediately for a code stroke.  Did not show anything acute on the CT scan did show an old posterior frontal lobe area of encephalomalacia.  Stroke neurology saw the patient did not feel that he was appropriate for any type of thrombolytic intervention.  He went to bed last  night feeling well.  They actually had been on a trip to [place] for the last week and flew home the other day.  He had no issues while he was there.  And no issues when he went to bed last night.  He is usually up ambulatory without any specific issues and complaints.  Did have some dyspnea but he has a diagnosis of COPD.  No specific chest pains or palpitations.  He has had no bleeding troubles at all and no recent cold or flu bugs.  Saw his cardiologist I think last week or so.  They discontinued his Coreg cut back on his atorvastatin.  Continue with the losartan.  Will going to set him up for a stress test just to check him out.  Does have a previous history of stenting back in 2001.     In the ER he was given aspirin.  Stroke neurology recommended admission with routine stroke evaluation.  Echo telemetry lipids etc.  Was also loaded on 300 mg of Plavix.        Assessment & Plan     Kem Abdullahi is a 83 year old male admitted on 9/24/2024. He presented to the emergency room with onset of right facial numbness gait abnormality upon awaking this morning     1.  Acute ischemic stroke: Patient presented with symptoms of slightly slurred speech some right-sided facial numbness possible droop and some gait abnormalities.  He went to bed last evening asymptomatic.  Has been having no issues in fact just returned from a trip to [place].  He came to the ER for immediate valuation.  CT showed nothing acute.  He was seen by stroke neurology.  They recommended aspirin Plavix.  MRI was subsequently performed it did show a very small right acute subcortical infarct in the perirolandic region.  He does have a more chronic appearing right MCA stroke with encephalomalacia.  Patient was not felt to be candidate for intervention.  The CT angiogram did not show any significant stenosis.  No current evidence of atrial fibrillation.  Does have some ongoing dizziness.  He is getting the load of aspirin Plavix.  Will have an echocardiogram  performed.  No obvious dysphagia now.  All of his symptoms and exam findings have basically resolved.  Will be reevaluated by stroke neurology.  -9/25: Alert pleasant really no distress he feels completely normal at this point.  Exam is unremarkable.  His dizziness has resolved.  He had no dysrhythmias overnight.  Unfortunately we do not have echo capabilities today.  This will have to be set up as an outpatient.  Will await stroke neurology reevaluation.  But anticipate discharge home after their visit.  The issue of his symptoms versus the site of his small stroke are a bit confusing to me.  Did have right sided issues.  The small stroke was noted in the right subcortical area.  Nothing was seen on the left or anything else in the midbrain.  After discussing further with stroke neurology it was felt that possibly patient had a embolic shower perhaps from previous history of paroxysmal atrial fibrillation.  The recommendation was to place him on anticoagulation.  The DOAC was cost prohibitive therefore he will be started on Coumadin with follow-up as an outpatient in the [facility] anticoagulation clinic.  This the ischemic stroke seen in the right perioral enteric region did not explain his presenting symptoms.  And that may have been more of a TIA issue.     2.  Coronary artery disease: Last interventions were back in 2021 where he had intervention on a chronically occluded right coronary artery.  His EF from that time was 35 to 40%.  No history of any atrial fibrillation.  He is going to have a scheduled outpatient stress test in the near future by his cardiology although he is having no current symptoms.  Will let him have passive hypertension.  Will not aggressively intervene at this point.  Pressures were quite high when he came in but they have already come down to 150 systolic range.  -9/25: Hemodynamically stable.  No chest pains.  He recently saw his cardiologist Dr. Manzo on September 18.   He was to be set up for an outpatient stress test just to assess his current coronary artery status as he does have some dyspnea on exertion.  Recently diagnosed with a COPD really just want to try and sort this all out.  Will set up for this and I think on this Thursday.  Would recommend that we just postpone this until he recovers from his current episode.  Also does have a history of some paroxysmal atrial fibrillation.  He has not been interested in starting any anticoagulation.  His last echocardiogram was actually in 2023 showed an EF of 50 to 55% with mild aortic stenosis.  We will plan on setting up an outpatient echocardiogram.     3.  COPD: Was diagnosed with this recently.  Does have inhaler as needed.  No evidence of any acute exacerbation.  Sats are 93% on room air.  No significant complaints of dyspnea     4.  Dizziness: Complains of just some dizziness.  Some mild nausea.  Just flew back from [place] this may be part of the problem.  His TMs look fine.  Could be from his stroke also it is difficult to sort out.  Does have some antiemetics ordered.  Will give him a dose of some low-dose diazepam at this time.  No evidence of any cerebellar or posterior stroke involvement.  -9/25: Dizziness has resolved.  He is able to get up and ambulate without any difficulty at all.      Consultations This Hospital Stay   NEUROLOGY IP STROKE CONSULT  SPEECH LANGUAGE PATH ADULT IP CONSULT  PHYSICAL THERAPY ADULT IP CONSULT  OCCUPATIONAL THERAPY ADULT IP CONSULT  REHAB ADMISSIONS LIAISON IP CONSULT  CARE MANAGEMENT / SOCIAL WORK IP CONSULT    Code Status   Full Code    Time Spent on this Encounter   I, Lee Cole MD, personally saw the patient today and spent greater than 30 minutes discharging this patient.       Lee Cole MD  [facility]  [address] [place] [id number]  Phone: [phone]  Fax:  [phone]  ______________________________________________________________________    Physical Exam   Vital Signs:                    Weight: 134 lbs .63 oz  Constitutional: awake, alert, cooperative, no apparent distress, and appears stated age  ENT: Normocephalic, without obvious abnormality, atraumatic, sinuses nontender on palpation, external ears without lesions, oral pharynx with moist mucous membranes, tonsils without erythema or exudates, gums normal and good dentition.  Respiratory: No increased work of breathing, good air exchange, clear to auscultation bilaterally, no crackles or wheezing  Cardiovascular: Normal apical impulse, regular rate and rhythm, normal S1 and S2, no S3 or S4, and no murmur noted  GI: No scars, normal bowel sounds, soft, non-distended, non-tender, no masses palpated, no hepatosplenomegally  Musculoskeletal: There is no redness, warmth, or swelling of the joints.  Full range of motion noted.  Motor strength is 5 out of 5 all extremities bilaterally.  Tone is normal.  Neurologic: Awake, alert, oriented to name, place and time.  Cranial nerves II-XII are grossly intact.  Motor is 5 out of 5 bilaterally.  Cerebellar finger to nose, heel to shin intact.  Sensory is intact.  Babinski down going, Romberg negative, and gait is normal.       Primary Care Physician   Katlyn Mccarty-Fabby    Discharge Orders      ANTICOAGULATION CLINIC REFERRAL      Reason for your hospital stay    Patient was admitted with onset of some slightly slurred speech right facial droop and perhaps some right sided gait issues upon awakening.  Last known well time was the night before.  CT scan showed an old right MCA distribution stroke.  Nothing acute.  Subsequent MRI scan did show an acute subcortical stroke on the right.  Nothing on the left however.  He was admitted.  Most of his symptoms subsequently resolved completely.  He was seen by stroke neurology.  Initially aspirin and Plavix were recommended.  Upon  further evaluations past history does have a history of paroxysmal atrial fibrillation.  Had an recommended to be on anticoagulation however has not been on this.  Further discussions he has agreed to this now.  The DOAC is extremely expensive and not covered with his insurance therefore we will start Coumadin on him.  He will [facility] for monitoring.     Follow-up and recommended labs and tests     Follow up with primary care provider, Katlyn Brice, within 7 days for hospital follow- up.  No follow up labs or test are needed.    Follow-up with the [facility] Coumadin clinic tomorrow.     Activity    Your activity upon discharge: activity as tolerated     Full Code     Echocardiogram Complete    Administration of IV contrast will be tailored to this examination per the appropriate written protocol listed in the Echocardiography department Protocol Book, or by the supervising Cardiologist. This may result in an order change.    Use of contrast is at the discretion of the supervising Cardiologist.     Diet    Follow this diet upon discharge: Current Diet:Orders Placed This Encounter      Regular Diet Adult       Significant Results and Procedures   Most Recent 3 CBC's:  Recent Labs   Lab Test 09/25/24  0510 09/24/24  0901 10/12/23  1040   WBC 6.7 4.0 4.3   HGB 14.9 14.0 14.8   MCV 90 90 89    193 203     Most Recent 3 BMP's:  Recent Labs   Lab Test 09/25/24  0510 09/24/24  0901 10/12/23  1040    137 139   POTASSIUM 3.6 3.7 4.4   CHLORIDE 101 102 102   CO2 25 24 30*   BUN 17.5 18.3 18.1   CR 0.81 0.83 0.77   ANIONGAP 13 11 7   JANEEN 9.8 8.7* 9.6   * 117* 97     Most Recent 2 LFT's:  Recent Labs   Lab Test 10/12/23  1040 05/09/19  2126   AST 59* 36   ALT 59 30   ALKPHOS 148* 75   BILITOTAL 1.6* 0.8     Most Recent INR's and Anticoagulation Dosing History:  Anticoagulation Dose History          Latest Ref Rng & Units 9/24/2024   Recent  Dosing and Labs   INR 0.85 - 1.15 1.15       Details                 7-Day Micro Results       No results found for the last 168 hours.          Most Recent Urinalysis:  Recent Labs   Lab Test 09/24/24  1702   COLOR Light Yellow   APPEARANCE Clear   URINEGLC 500*   URINEBILI Negative   URINEKETONE 20*   SG 1.015   UBLD Negative   URINEPH 6.5   PROTEIN 20*   NITRITE Negative   LEUKEST Negative   RBCU 2   WBCU <1     Most Recent ESR & CRP:No lab results found.,   Results for orders placed or performed during the hospital encounter of 09/24/24   CT Head w/o Contrast    Narrative    Exam: CT HEAD W/O CONTRAST    Clinical history:83 years Male Code Stroke to evaluate for potential  thrombolysis and thrombectomy. PLEASE READ IMMEDIATELY.  . Patient  woke up with right-sided facial droop and vertigo.    Comparisons:None    Technique: Axial CT imaging of the head was performed without  intervenous contrast.  This exam was performed using one or more of the following dose  reduction techniques:  Automated exposure control, adjustment of the TAMI and/or KV according  to patient's size, and/or use of iterative reconstruction technique.    FINDINGS: There is a hypodensity compatible with encephalomalacia  involving the posterior right frontal lobe. This is likely sequela of  an old infarct.   Ventricles and sulci are symmetric. The gray-white matter  differentiation throughout the brain is otherwise well maintained.  There is no evidence of intracranial mass or hemorrhage. Visualized  portions of the paranasal sinuses and mastoid air cells are well  aerated.   There is no evidence of skull fracture.      Impression    IMPRESSION: No acute changes. No evidence of mass or hemorrhage.    Old infarct right posterior frontal lobe.    These findings were called to the emergency room provider    NEDA FERNÁNDEZ MD         SYSTEM ID:  [id number]   CTA Head Neck with Contrast    Narrative    CTA HEAD NECK W CONTRAST    HISTORY: 83 years  Male Code Stroke to evaluate for potential  thrombolysis and thrombectomy. PLEASE READ IMMEDIATELY.    COMPARISON: None    TECHNIQUE: Contrast-enhanced CT angiography of the neck and head was  performed with intervenous contrast. Multiplanar reconstructions and  MIP, volume rendered and 3-D MIP reconstructions were obtained on a  3-D workstation.  This exam was performed using one or more of the following dose  reduction techniques:  Automated exposure control, adjustment of the TAMI and/or KV according  to patient's size, and/or use of iterative reconstruction technique.    FINDINGS:    Right neck:            Brachiocephalic artery: patent            Common carotid artery:  patent            Internal carotid artery: There is calcified atherosclerotic  plaque with stenosis of less than 50% of the carotid bulb.            Vertebral artery:Widely patent                Left neck:            Common carotid artery: Widely patent            Internal carotid artery: There is calcified atherosclerotic  plaque with stenosis of less than 50%.            Vertebral artery:Widely patent      Head:                      Anterior circulation:The vessels are patent without evidence  of abrupt cut off. There is no evidence of aneurysm.           Posterior circulation:The vessels are patent. There is no  abrupt cut off. There is no evidence of aneurysm      Impression    IMPRESSION:No acute findings. There is no evidence of significant  carotid artery stenosis. There is no evidence of large vessel  occlusion or intracranial aneurysm.    NEDA FERNÁNDEZ MD         SYSTEM ID:  [id number]   CT Head Perfusion w Contrast - For Tier 2 Stroke    Narrative    EXAM: CT HEAD PERFUSION W CONTRAST 9/24/2024 9:10 AM    PROVIDED HISTORY: Code Stroke to evaluate for potential thrombolysis  and thrombectomy. Evaluate mismatch between penumbra and core infarct.  READ IMMEDIATELY. Large vessel occlusion    COMPARISON: CT head and CTA head and neck  9/24/2024    TECHNIQUE:   CT BRAIN PERFUSION: Dynamic perfusion CT of the brain was performed at  multiple levels after administration of intravenous contrast; Image  processing was performed by RAPID AI for the generation of color rCBF,  rCBV, Tmax, and MTT.     CONTRAST: ISOVUE 370  50mL    FINDINGS:     CT Perfusion:   Images documenting relative cerebral blood volume, cerebral blood flow  and mean transit time demonstrate no evidence of acute infarction.  There is abnormal perfusion in the right frontal opercular region  where there is a 7 mL volume of tissue demonstrating CBF of less than  30% and 5 mL volume of demonstrating Tmax greater than 6 seconds.  Correlating with CT head, findings are consistent with chronic area of  hypoperfusion secondary to prior infarct.      Impression    IMPRESSION:     No evidence of acute infarction on the CT Perfusion examination.    Chronic right frontal opercular infarct.    [Result: No acute abnormality on CT head perfusion]    Finding was identified on 9/24/2024 9:26 AM.     Provider ANNABEL ASENCIO discussed results over the phone with Dr. Pleitez at 9/24/2024 9:32 AM and verbalized understanding of the  result.     PAMELA PLEITEZ MD         SYSTEM ID:  [id number]   MR Brain w/o & w Contrast    Narrative    EXAM: MR BRAIN W/O & W CONTRAST    HISTORY: New stroke, right sided deficits, woke up this morning.    TECHNIQUE: Multiplanar, multisequence MR imaging of the head obtained  prior to, and after, intravenous contrast administration    MEDS/CONTRAST: Gadavist   7.5 mL    COMPARISON: CT head, CTA head and neck and CT perfusion 9/24/2024     FINDINGS:      There is no mass effect, midline shift, or evidence of acute  intracranial hemorrhage. Diffusion weighted images demonstrate small  small subcortical acute infarct in the right jayson-Rolandic region. No  other area of abnormal diffusion restriction. The ventricles are  proportionate to the cerebral sulci. Scattered foci of T2  hyperintense  FLAIR signal in the periventricular and subcortical white matter,  which is nonspecific, likely related to chronic small vessel ischemic  disease given the patient's age. Right frontal opercular region  encephalomalacia, consistent with prior MCA territory infarct. Normal  parenchymal volume for age. Normal major intracranial vascular flow  voids.    Postcontrast images demonstrate no abnormal intracranial enhancement.    No suspicious abnormality of the skull marrow signal. Mild paranasal  sinus mucosal thickening. Trace mastoid fluid bilaterally. Bilateral  pseudophakia.      Impression    IMPRESSION:    Small acute subcortical infarct in the right perirolandic region.    [Result: Small right frontal acute subcortical infarct]    Dr. Pleitez discussed results over the phone at 9/24/2024 11:13 AM with  inpatient floor nurse provider who verbalized understanding of the  result.    PAMELA PLEITEZ MD         SYSTEM ID:  [id number]       Discharge Medications   Discharge Medication List as of 9/25/2024  3:11 PM        CONTINUE these medications which have CHANGED    Details   warfarin ANTICOAGULANT (COUMADIN) 5 MG tablet Take 5mg (1 tab) 9/26/24;  Recheck INR on 9/27/24 and receive additional dosing instructions per [facility] No Print Out           CONTINUE these medications which have NOT CHANGED    Details   albuterol (PROAIR HFA/PROVENTIL HFA/VENTOLIN HFA) 108 (90 Base) MCG/ACT inhaler Inhale 2 puffs into the lungs every 4 hours as needed., Historical      atorvastatin (LIPITOR) 80 MG tablet Take 40 mg by mouth every evening., Historical      cetirizine (ZYRTEC) 10 MG tablet Take 10 mg by mouth daily as needed (nasal drainage)., Historical      levalbuterol (XOPENEX HFA) 45 MCG/ACT inhaler Inhale 2 puffs into the lungs every 4 hours as needed for shortness of breath or wheezing, Disp-15 g, R-0, E-Prescribe      losartan (COZAAR) 50 MG tablet Take 50 mg by mouth  every morning., Historical      nitroGLYcerin (NITROSTAT) 0.4 MG sublingual tablet Place 0.4 mg under the tongue every 5 minutes as needed for chest pain. -for 3 doses. If symptoms persist 5 minutes after 1st dose call 911., Historical      sildenafil (VIAGRA) 100 MG tablet Take 100 mg by mouth daily as needed (sexual activity). : administer 30 minutes to 4 hours before activity. Do not use with nitroglycerin., Historical      zinc 50 MG TABS Take 1 tablet by mouth daily, Historical           STOP taking these medications       aspirin (ASA) 325 MG tablet Comments:   Reason for Stopping:         ibuprofen (ADVIL/MOTRIN) 200 MG tablet Comments:   Reason for Stopping:             Allergies   No Known Allergies

## 2025-03-19 ENCOUNTER — HOSPITAL ENCOUNTER (EMERGENCY)
Facility: HOSPITAL | Age: 85
Discharge: HOME | End: 2025-03-19
Attending: NURSE PRACTITIONER
Payer: MEDICARE

## 2025-03-19 VITALS
BODY MASS INDEX: 20.73 KG/M2 | HEIGHT: 69 IN | WEIGHT: 140 LBS | RESPIRATION RATE: 16 BRPM | OXYGEN SATURATION: 95 % | SYSTOLIC BLOOD PRESSURE: 220 MMHG | TEMPERATURE: 96.7 F | DIASTOLIC BLOOD PRESSURE: 117 MMHG | HEART RATE: 68 BPM

## 2025-03-19 DIAGNOSIS — K14.8 TONGUE LESION: Primary | ICD-10-CM

## 2025-03-19 PROCEDURE — 99213 OFFICE O/P EST LOW 20 MIN: CPT | Performed by: NURSE PRACTITIONER

## 2025-03-19 PROCEDURE — G0463 HOSPITAL OUTPT CLINIC VISIT: HCPCS

## 2025-03-19 RX ORDER — NYSTATIN 100000 [USP'U]/ML
500000 SUSPENSION ORAL 4 TIMES DAILY
Qty: 140 ML | Refills: 0 | Status: SHIPPED | OUTPATIENT
Start: 2025-03-19 | End: 2025-03-26

## 2025-03-19 ASSESSMENT — ENCOUNTER SYMPTOMS
VOICE CHANGE: 0
FEVER: 0
SHORTNESS OF BREATH: 0
TROUBLE SWALLOWING: 0
CHILLS: 0
SORE THROAT: 1

## 2025-03-19 ASSESSMENT — ACTIVITIES OF DAILY LIVING (ADL): ADLS_ACUITY_SCORE: 51

## 2025-03-19 NOTE — DISCHARGE INSTRUCTIONS
Take the nystatin as prescribed.     Get a new toothbrush.     Follow up with your doctor if no improvement in symptoms.    Return to emergency department for worsening or concerning symptoms.

## 2025-03-19 NOTE — ED PROVIDER NOTES
History     Chief Complaint   Patient presents with    Pharyngitis    Oral Swelling     HPI  Kem Abdullahi is a 84 year old male who presents to urgent care with concerns of thrush.  Patient tells me about 3 to 4 weeks ago he got new partial dentures.  Ever since then he has had a white residue on his tongue.  This is accompanied by very mild sore throat and some tongue pain.  He tells me sometimes he tries to scrape it off at bedtime and appears to get better.  However, in the morning he always has a lot more white residue on his tongue.  No recent antibiotic or steroid use.  Patient states he has tried several home treatments including vinegar, salt water, brushing with minimal effectiveness.  Swallowing with minimal difficulty.  Denies any trouble with breathing.    Of note, patient tells me that he has had chronic postnasal drainage that he has attempted to treat with cetirizine in the past with minimal effectiveness.  He tells me that his brother has similar symptoms.  He has never gotten evaluated for it any further.  This is not changed since he started noticing the residual on his tongue.  Does not want any evaluation for this today.    Allergies:  No Known Allergies    Problem List:    Patient Active Problem List    Diagnosis Date Noted    Paroxysmal atrial fibrillation (H) 09/25/2024     Priority: Medium    Acute systolic heart failure (H) 09/24/2024     Priority: Medium    Cardiovascular symptoms 09/24/2024     Priority: Medium    Acute ischemic stroke (H) 09/24/2024     Priority: Medium    COPD (chronic obstructive pulmonary disease) (H) 09/24/2024     Priority: Medium    ASHD (arteriosclerotic heart disease) 08/30/2021     Priority: Medium     Formatting of this note might be different from the original.    of RCA      Hyperlipidemia 08/30/2021     Priority: Medium        Past Medical History:    Past Medical History:   Diagnosis Date    Coronary artery disease     ED (erectile dysfunction)      "Hyperlipidemia     Hypertension     Stented coronary artery 2006       Past Surgical History:    Past Surgical History:   Procedure Laterality Date    BACK SURGERY  2009    lower back    COLONOSCOPY      HERNIA REPAIR      PHACOEMULSIFICATION WITH STANDARD INTRAOCULAR LENS IMPLANT Left 5/23/2016    Procedure: PHACOEMULSIFICATION WITH STANDARD INTRAOCULAR LENS IMPLANT;  Surgeon: Bi Wayne MD;  Location: [facility]       Family History:    No family history on file.    Social History:  Marital Status:  Single [1]  Social History     Tobacco Use    Smoking status: Former    Smokeless tobacco: Never   Substance Use Topics    Alcohol use: Yes     Comment: occassionally    Drug use: Not Currently     Comment: Drug use: \"no\"        Medications:    atorvastatin (LIPITOR) 80 MG tablet  nystatin (MYCOSTATIN) 319449 UNIT/ML suspension  albuterol (PROAIR HFA/PROVENTIL HFA/VENTOLIN HFA) 108 (90 Base) MCG/ACT inhaler  cetirizine (ZYRTEC) 10 MG tablet  levalbuterol (XOPENEX HFA) 45 MCG/ACT inhaler  losartan (COZAAR) 50 MG tablet  nitroGLYcerin (NITROSTAT) 0.4 MG sublingual tablet  sildenafil (VIAGRA) 100 MG tablet  warfarin ANTICOAGULANT (COUMADIN) 5 MG tablet  zinc 50 MG TABS          Review of Systems   Constitutional:  Negative for chills and fever.   HENT:  Positive for mouth sores, postnasal drip (Chronic) and sore throat. Negative for trouble swallowing and voice change.    Respiratory:  Negative for shortness of breath.    All other systems reviewed and are negative.      Physical Exam   BP: (!) 220/117  Pulse: 68  Temp: (!) 96.7  F (35.9  C)  Resp: 16  Height: 174 cm (5' 8.5\")  Weight: 63.5 kg (140 lb)  SpO2: 95 %      Physical Exam  Vitals and nursing note reviewed.   Constitutional:       General: He is not in acute distress.     Appearance: He is well-developed. He is not diaphoretic.   HENT:      Head: Normocephalic and atraumatic.      Mouth/Throat:      Lips: Pink.      Mouth: Mucous membranes are moist.      " Dentition: No gum lesions.      Tongue: Lesions present.      Palate: No lesions.      Pharynx: Uvula midline. No pharyngeal swelling, oropharyngeal exudate, posterior oropharyngeal erythema or uvula swelling.      Tonsils: No tonsillar exudate.      Comments: White-colored lesions noted on patient's tongue.      No obvious ulcerations or sores appreciated to the rest of the oral mucosa.  No other white residue/lesions appreciated.    Dentures in place.  Eyes:      Pupils: Pupils are equal, round, and reactive to light.   Cardiovascular:      Rate and Rhythm: Normal rate.   Pulmonary:      Effort: Pulmonary effort is normal.   Musculoskeletal:      Cervical back: Normal range of motion and neck supple.   Skin:     General: Skin is warm and dry.      Coloration: Skin is not pale.   Neurological:      Mental Status: He is alert and oriented to person, place, and time.         ED Course        Procedures         No results found for this or any previous visit (from the past 24 hours).    Medications - No data to display    Assessments & Plan (with Medical Decision Making)   This is an 84-year-old male that has had white-colored lesions on his tongue over the last 3 to 4 weeks since getting new dentures.  He was only noted to have white lesions on his tongue and no other lesions appreciated to his oral mucosa.  Swallow with minimal difficulty.  Uvula was midline.  No erythematous posterior pharynx.  Pleasantly talkative.    Slightly suspicious for thrush which patient is mostly concerned about.  However, he seems to state that he is able to remove the residue or at least lessen it at night but when he wakes up in the morning there is a lot of residue.  Also notes that he has had some chronic postnasal drainage that is unchanged.    Long discussion with patient regarding his current symptoms.  Discussed with patient that symptoms are not consistent with classic thrush.  Patient is adamant that all is what he has.  Using  shared decision making, patient will treat his symptoms with nystatin.  He will change his toothbrush.  If symptoms persist he will follow-up with his primary doctor for evaluation.  Strict return precautions to UC/ER discussed with patient verbalizing understanding and agreement with plan.    I have reviewed the nursing notes.    I have reviewed the findings, diagnosis, plan and need for follow up with the patient.  This document was prepared using a combination of typing and voice generated software.  While every attempt was made for accuracy, spelling and grammatical errors may exist.         Discharge Medication List as of 3/19/2025  3:16 PM        START taking these medications    Details   nystatin (MYCOSTATIN) 160908 UNIT/ML suspension Take 5 mLs (500,000 Units) by mouth 4 times daily for 7 days.Disp-140 mL, [id number]             Final diagnoses:   Tongue lesion       3/19/2025   [facility]       Mpofu, Prudence, CNP  03/19/25 [id number]

## 2025-03-19 NOTE — ED NOTES
"Patient self-presents to UC with c/o white tongue, coughing out white \"gunk\", tongue discomfort, and taste being impacted. Patient reports getting new artifical teeth three weeks ago.   "

## 2025-04-05 ENCOUNTER — HOSPITAL ENCOUNTER (EMERGENCY)
Facility: HOSPITAL | Age: 85
Discharge: OTHER INSTITUTION | End: 2025-04-05
Attending: NURSE PRACTITIONER
Payer: MEDICARE

## 2025-04-05 ENCOUNTER — APPOINTMENT (OUTPATIENT)
Dept: ULTRASOUND IMAGING | Facility: HOSPITAL | Age: 85
End: 2025-04-05
Attending: NURSE PRACTITIONER
Payer: MEDICARE

## 2025-04-05 ENCOUNTER — APPOINTMENT (OUTPATIENT)
Dept: CT IMAGING | Facility: HOSPITAL | Age: 85
End: 2025-04-05
Attending: NURSE PRACTITIONER
Payer: MEDICARE

## 2025-04-05 VITALS
BODY MASS INDEX: 20.47 KG/M2 | TEMPERATURE: 96.3 F | RESPIRATION RATE: 20 BRPM | SYSTOLIC BLOOD PRESSURE: 180 MMHG | WEIGHT: 136.6 LBS | OXYGEN SATURATION: 90 % | HEART RATE: 53 BPM | DIASTOLIC BLOOD PRESSURE: 95 MMHG

## 2025-04-05 DIAGNOSIS — I10 ASYMPTOMATIC HYPERTENSION: ICD-10-CM

## 2025-04-05 DIAGNOSIS — K81.9 CHOLECYSTITIS: ICD-10-CM

## 2025-04-05 DIAGNOSIS — K85.10 GALLSTONE PANCREATITIS: ICD-10-CM

## 2025-04-05 LAB
ALBUMIN SERPL BCG-MCNC: 4.5 G/DL (ref 3.5–5.2)
ALBUMIN UR-MCNC: NEGATIVE MG/DL
ALP SERPL-CCNC: 140 U/L (ref 40–150)
ALT SERPL W P-5'-P-CCNC: 64 U/L (ref 0–70)
ANION GAP SERPL CALCULATED.3IONS-SCNC: 11 MMOL/L (ref 7–15)
APPEARANCE UR: CLEAR
AST SERPL W P-5'-P-CCNC: 130 U/L (ref 0–45)
BILIRUB SERPL-MCNC: 2.2 MG/DL
BILIRUB UR QL STRIP: NEGATIVE
BUN SERPL-MCNC: 19.5 MG/DL (ref 8–23)
CALCIUM SERPL-MCNC: 10.2 MG/DL (ref 8.8–10.4)
CHLORIDE SERPL-SCNC: 97 MMOL/L (ref 98–107)
COLOR UR AUTO: YELLOW
CREAT SERPL-MCNC: 0.76 MG/DL (ref 0.67–1.17)
EGFRCR SERPLBLD CKD-EPI 2021: 90 ML/MIN/1.73M2
ERYTHROCYTE [DISTWIDTH] IN BLOOD BY AUTOMATED COUNT: 15.2 % (ref 10–15)
GLUCOSE SERPL-MCNC: 104 MG/DL (ref 70–99)
GLUCOSE UR STRIP-MCNC: NEGATIVE MG/DL
HCO3 SERPL-SCNC: 29 MMOL/L (ref 22–29)
HCT VFR BLD AUTO: 48.8 % (ref 40–53)
HGB BLD-MCNC: 16.2 G/DL (ref 13.3–17.7)
HGB UR QL STRIP: NEGATIVE
HOLD SPECIMEN: NORMAL
HOLD SPECIMEN: NORMAL
INR PPP: 0.96 (ref 0.85–1.15)
KETONES UR STRIP-MCNC: NEGATIVE MG/DL
LEUKOCYTE ESTERASE UR QL STRIP: NEGATIVE
LIPASE SERPL-CCNC: 1379 U/L (ref 13–60)
MAGNESIUM SERPL-MCNC: 2 MG/DL (ref 1.7–2.3)
MCH RBC QN AUTO: 30.3 PG (ref 26.5–33)
MCHC RBC AUTO-ENTMCNC: 33.2 G/DL (ref 31.5–36.5)
MCV RBC AUTO: 91 FL (ref 78–100)
NITRATE UR QL: NEGATIVE
PH UR STRIP: 7 [PH] (ref 4.7–8)
PLATELET # BLD AUTO: 248 10E3/UL (ref 150–450)
POTASSIUM SERPL-SCNC: 4 MMOL/L (ref 3.4–5.3)
PROT SERPL-MCNC: 7.8 G/DL (ref 6.4–8.3)
RADIOLOGIST FLAGS: NORMAL
RBC # BLD AUTO: 5.35 10E6/UL (ref 4.4–5.9)
RBC URINE: <1 /HPF
SODIUM SERPL-SCNC: 137 MMOL/L (ref 135–145)
SP GR UR STRIP: 1.01 (ref 1–1.03)
SQUAMOUS EPITHELIAL: 0 /HPF
TROPONIN T SERPL HS-MCNC: 24 NG/L
TROPONIN T SERPL HS-MCNC: 26 NG/L
UROBILINOGEN UR STRIP-MCNC: NORMAL MG/DL
WBC # BLD AUTO: 7.3 10E3/UL (ref 4–11)
WBC URINE: 0 /HPF

## 2025-04-05 PROCEDURE — 99285 EMERGENCY DEPT VISIT HI MDM: CPT | Mod: 25 | Performed by: NURSE PRACTITIONER

## 2025-04-05 PROCEDURE — 80053 COMPREHEN METABOLIC PANEL: CPT | Performed by: NURSE PRACTITIONER

## 2025-04-05 PROCEDURE — 99285 EMERGENCY DEPT VISIT HI MDM: CPT | Mod: 25

## 2025-04-05 PROCEDURE — 81003 URINALYSIS AUTO W/O SCOPE: CPT | Performed by: NURSE PRACTITIONER

## 2025-04-05 PROCEDURE — 83735 ASSAY OF MAGNESIUM: CPT | Performed by: NURSE PRACTITIONER

## 2025-04-05 PROCEDURE — 76705 ECHO EXAM OF ABDOMEN: CPT | Mod: 26 | Performed by: RADIOLOGY

## 2025-04-05 PROCEDURE — 93005 ELECTROCARDIOGRAM TRACING: CPT

## 2025-04-05 PROCEDURE — 36415 COLL VENOUS BLD VENIPUNCTURE: CPT | Performed by: NURSE PRACTITIONER

## 2025-04-05 PROCEDURE — 93010 ELECTROCARDIOGRAM REPORT: CPT | Performed by: INTERNAL MEDICINE

## 2025-04-05 PROCEDURE — 74177 CT ABD & PELVIS W/CONTRAST: CPT | Mod: 26 | Performed by: RADIOLOGY

## 2025-04-05 PROCEDURE — 84484 ASSAY OF TROPONIN QUANT: CPT | Performed by: NURSE PRACTITIONER

## 2025-04-05 PROCEDURE — 83690 ASSAY OF LIPASE: CPT | Performed by: NURSE PRACTITIONER

## 2025-04-05 PROCEDURE — 250N000011 HC RX IP 250 OP 636: Performed by: NURSE PRACTITIONER

## 2025-04-05 PROCEDURE — 96375 TX/PRO/DX INJ NEW DRUG ADDON: CPT

## 2025-04-05 PROCEDURE — 76705 ECHO EXAM OF ABDOMEN: CPT | Mod: 26 | Performed by: NURSE PRACTITIONER

## 2025-04-05 PROCEDURE — 81001 URINALYSIS AUTO W/SCOPE: CPT | Performed by: NURSE PRACTITIONER

## 2025-04-05 PROCEDURE — 76705 ECHO EXAM OF ABDOMEN: CPT | Mod: TC

## 2025-04-05 PROCEDURE — 76705 ECHO EXAM OF ABDOMEN: CPT

## 2025-04-05 PROCEDURE — 85027 COMPLETE CBC AUTOMATED: CPT | Performed by: NURSE PRACTITIONER

## 2025-04-05 PROCEDURE — 74177 CT ABD & PELVIS W/CONTRAST: CPT

## 2025-04-05 PROCEDURE — 85610 PROTHROMBIN TIME: CPT | Performed by: NURSE PRACTITIONER

## 2025-04-05 PROCEDURE — 96374 THER/PROPH/DIAG INJ IV PUSH: CPT

## 2025-04-05 RX ORDER — IOPAMIDOL 755 MG/ML
70 INJECTION, SOLUTION INTRAVASCULAR ONCE
Status: DISCONTINUED | OUTPATIENT
Start: 2025-04-05 | End: 2025-04-06 | Stop reason: HOSPADM

## 2025-04-05 RX ORDER — LABETALOL HYDROCHLORIDE 5 MG/ML
10 INJECTION, SOLUTION INTRAVENOUS ONCE
Status: COMPLETED | OUTPATIENT
Start: 2025-04-05 | End: 2025-04-05

## 2025-04-05 RX ORDER — HYDROMORPHONE HYDROCHLORIDE 1 MG/ML
0.5 INJECTION, SOLUTION INTRAMUSCULAR; INTRAVENOUS; SUBCUTANEOUS
Status: COMPLETED | OUTPATIENT
Start: 2025-04-05 | End: 2025-04-05

## 2025-04-05 RX ORDER — IOPAMIDOL 755 MG/ML
67 INJECTION, SOLUTION INTRAVASCULAR ONCE
Status: COMPLETED | OUTPATIENT
Start: 2025-04-05 | End: 2025-04-05

## 2025-04-05 RX ORDER — SENNOSIDES 8.6 MG
650 CAPSULE ORAL DAILY
COMMUNITY

## 2025-04-05 RX ADMIN — HYDROMORPHONE HYDROCHLORIDE 0.5 MG: 1 INJECTION, SOLUTION INTRAMUSCULAR; INTRAVENOUS; SUBCUTANEOUS at 20:50

## 2025-04-05 RX ADMIN — IOPAMIDOL 67 ML: 755 INJECTION, SOLUTION INTRAVENOUS at 19:36

## 2025-04-05 RX ADMIN — LABETALOL HYDROCHLORIDE 10 MG: 5 INJECTION, SOLUTION INTRAVENOUS at 20:54

## 2025-04-05 ASSESSMENT — ACTIVITIES OF DAILY LIVING (ADL)
ADLS_ACUITY_SCORE: 51

## 2025-04-05 ASSESSMENT — ENCOUNTER SYMPTOMS
MUSCULOSKELETAL NEGATIVE: 1
BLOOD IN STOOL: 0
ABDOMINAL PAIN: 1
CONSTIPATION: 0
CARDIOVASCULAR NEGATIVE: 1
ALLERGIC/IMMUNOLOGIC NEGATIVE: 1
RESPIRATORY NEGATIVE: 1
HEMATOLOGIC/LYMPHATIC NEGATIVE: 1
NAUSEA: 1
VOMITING: 1
EYES NEGATIVE: 1
NEUROLOGICAL NEGATIVE: 1
DIARRHEA: 0
CONSTITUTIONAL NEGATIVE: 1
PSYCHIATRIC NEGATIVE: 1
ENDOCRINE NEGATIVE: 1

## 2025-04-05 ASSESSMENT — COLUMBIA-SUICIDE SEVERITY RATING SCALE - C-SSRS
2. HAVE YOU ACTUALLY HAD ANY THOUGHTS OF KILLING YOURSELF IN THE PAST MONTH?: NO
6. HAVE YOU EVER DONE ANYTHING, STARTED TO DO ANYTHING, OR PREPARED TO DO ANYTHING TO END YOUR LIFE?: NO
1. IN THE PAST MONTH, HAVE YOU WISHED YOU WERE DEAD OR WISHED YOU COULD GO TO SLEEP AND NOT WAKE UP?: NO

## 2025-04-05 NOTE — ED PROVIDER NOTES
"  History     Chief Complaint   Patient presents with    Abdominal Pain     HPI  Kem Abdullahi is a 84 year old individual with history of systolic heart failure, stroke, COPD, paroxysmal atrial fibrillation on anticoagulation with warfarin, comes in for upper abdominal pain.  Patient states developed pain last night and has been constant since.  Took Pepto-Bismol with no relief.  Comes in for evaluation.  No fever or chills.  Has had nausea with vomiting.  No blood in emesis reported.  No dysuria or hematuria.  No testicular or penile abnormalities.  Last bowel movement was this morning and slightly \"harder than usual\".  No melena or hematochezia.  States has multiple hernia surgeries in the past but no other abdominal surgeries.  Last ate breakfast this morning.    Allergies:  No Known Allergies    Problem List:    Patient Active Problem List    Diagnosis Date Noted    Paroxysmal atrial fibrillation (H) 09/25/2024     Priority: Medium    Acute systolic heart failure (H) 09/24/2024     Priority: Medium    Cardiovascular symptoms 09/24/2024     Priority: Medium    Acute ischemic stroke (H) 09/24/2024     Priority: Medium    COPD (chronic obstructive pulmonary disease) (H) 09/24/2024     Priority: Medium    ASHD (arteriosclerotic heart disease) 08/30/2021     Priority: Medium     Formatting of this note might be different from the original.    of RCA      Hyperlipidemia 08/30/2021     Priority: Medium        Past Medical History:    Past Medical History:   Diagnosis Date    Coronary artery disease     ED (erectile dysfunction)     Hyperlipidemia     Hypertension     Stented coronary artery 2006       Past Surgical History:    Past Surgical History:   Procedure Laterality Date    BACK SURGERY  2009    lower back    COLONOSCOPY      HERNIA REPAIR      PHACOEMULSIFICATION WITH STANDARD INTRAOCULAR LENS IMPLANT Left 5/23/2016    Procedure: PHACOEMULSIFICATION WITH STANDARD INTRAOCULAR LENS IMPLANT;  Surgeon: Tone" "MD Bi;  Location: [facility]       Family History:    No family history on file.    Social History:  Marital Status:  Single [1]  Social History     Tobacco Use    Smoking status: Former    Smokeless tobacco: Never   Substance Use Topics    Alcohol use: Yes     Comment: occassionally    Drug use: Not Currently     Comment: Drug use: \"no\"        Medications:    aspirin (ASA) 325 MG EC tablet  atorvastatin (LIPITOR) 80 MG tablet  cetirizine (ZYRTEC) 10 MG tablet  losartan (COZAAR) 50 MG tablet  zinc 50 MG TABS  albuterol (PROAIR HFA/PROVENTIL HFA/VENTOLIN HFA) 108 (90 Base) MCG/ACT inhaler  levalbuterol (XOPENEX HFA) 45 MCG/ACT inhaler  nitroGLYcerin (NITROSTAT) 0.4 MG sublingual tablet  sildenafil (VIAGRA) 100 MG tablet  warfarin ANTICOAGULANT (COUMADIN) 5 MG tablet          Review of Systems   Constitutional: Negative.    HENT: Negative.     Eyes: Negative.    Respiratory: Negative.     Cardiovascular: Negative.    Gastrointestinal:  Positive for abdominal pain, nausea and vomiting. Negative for blood in stool, constipation and diarrhea.   Endocrine: Negative.    Genitourinary: Negative.    Musculoskeletal: Negative.    Skin: Negative.    Allergic/Immunologic: Negative.    Neurological: Negative.    Hematological: Negative.    Psychiatric/Behavioral: Negative.         Physical Exam   BP: (!) 240/107 (Left arm)  Pulse: 62  Temp: (!) 96.3  F (35.7  C)  Resp: 20  Weight: 62 kg (136 lb 9.6 oz)  SpO2: 97 %      GENERAL APPEARANCE:  The patient is a 84 year old well-developed, well-nourished individual that appears as stated age.  CHEST:  Symmetric.  Non-tender to palpation.  No crepitus or deformity.  LUNGS:  Breathing is easy.  Breath sounds are equal and clear bilaterally.  No wheezes, rhonchi, or rales.  HEART:  Regular rate and rhythm with normal S1 and S2.  No murmurs, gallops, or rubs.  ABDOMEN:  Soft.  Epigastric tenderness to palpation.  No mass, guarding, or rebound.  No organomegaly or hernia.  Bowel sounds are " present.  No CVA tenderness or flank mass.  No abdominal bruits or thrills present upon auscultation/palpation.  GENITOURINARY: No obvious anterior pelvic tenderness, hernia, mass noted to palpation.  PSYCHIATRIC:  The patient is awake, alert, and oriented x4.  Recent and remote memory is intact.  Appropriate mood and affect.  Calm and cooperative with history and physical exam.  SKIN:  Warm, dry, and well perfused.  Good turgor.  No lesions, nodules, or rashes are noted.  No bruising noted.      ED Course     ED Course as of 04/05/25 2240   Sat Apr 05, 2025   1757 Labs ordered.   1757 In to see patient and history/physical completed.    1850 Lipase(!): 1,379  Lipase elevated at 1379.   1909 POCUS biliary ultrasound was conducted showing extremely dilated gallbladder.  No wall thickening or obvious stones noted.  No dilation of common bile duct.  CT abdomen pelvis of IV contrast ordered.   1910 Troponin T, High Sensitivity(!): 26  High-sensitivity troponin 26.  2-hour repeat ordered.   2033 CT shows gallbladder distention with pericholecystic edema with moderate extrahepatic and mild intrahepatic biliary dilation.  Radiologic ultrasound ordered.  Hydromorphone 0.5 mg IV ordered for pain.   2046 Labetalol 10 mg IV ordered for hypertension.  Looking at previous records patient came in and blood pressure was elevated at this.   2118 Patient had significant reduction in blood pressure after labetalol.  Will continue to monitor.   2156 Ultrasound shows dilated gallbladder with pericholecystic fluid and common bile duct dilation without obvious obstructing stone.  Discussed case with general surgeon Dr. Narayan.  He will look at images and call back.   2206 At this time patient has to be treated as gallstone pancreatitis and will likely need place that has ERCP capability per Dr. Narayan.   2211 Contacted [facility] Dr. Barajas.  Patient accepted for transfer.     POC US ABDOMEN LIMITED    Date/Time: 4/5/2025  7:05 PM    Performed by: Brad Cueva APRN CNP  Authorized by: Brad Cueva APRN CNP    Procedure details:     Indications: abdominal pain      Assessment for:  Gallstones    Hepatobiliary:  Visualized  Hepatobiliary findings:     Common bile duct normal: Inconclusive.    Gallbladder wall:  Normal    Gallbladder stones: not identified      Intra-abdominal fluid: not identified      Sonographic Greenberg's sign: negative    Comments:      Extremely dilated gallbladder present.  Unsure if measured common bile duct.    ECG:    ECG competed at 1850 and personally reviewed at 1854 showing sinus rhythm with sinus arrhythmia and PVC.  Ventricular rate 67 and QTc of 460.  Normal axis.  Inferior ST/T wave abnormality present.  Voltage criteria for LVH present.  Abnormal ECG.  When compared to ECG from 9/20/2024, no significant changes noted.        Results for orders placed or performed during the hospital encounter of 04/05/25 (from the past 24 hours)   CBC with platelets   Result Value Ref Range    WBC Count 7.3 4.0 - 11.0 10e3/uL    RBC Count 5.35 4.40 - 5.90 10e6/uL    Hemoglobin 16.2 13.3 - 17.7 g/dL    Hematocrit 48.8 40.0 - 53.0 %    MCV 91 78 - 100 fL    MCH 30.3 26.5 - 33.0 pg    MCHC 33.2 31.5 - 36.5 g/dL    RDW 15.2 (H) 10.0 - 15.0 %    Platelet Count 248 150 - 450 10e3/uL   Comprehensive metabolic panel   Result Value Ref Range    Sodium 137 135 - 145 mmol/L    Potassium 4.0 3.4 - 5.3 mmol/L    Carbon Dioxide (CO2) 29 22 - 29 mmol/L    Anion Gap 11 7 - 15 mmol/L    Urea Nitrogen 19.5 8.0 - 23.0 mg/dL    Creatinine 0.76 0.67 - 1.17 mg/dL    GFR Estimate 90 >60 mL/min/1.73m2    Calcium 10.2 8.8 - 10.4 mg/dL    Chloride 97 (L) 98 - 107 mmol/L    Glucose 104 (H) 70 - 99 mg/dL    Alkaline Phosphatase 140 40 - 150 U/L     (H) 0 - 45 U/L    ALT 64 0 - 70 U/L    Protein Total 7.8 6.4 - 8.3 g/dL    Albumin 4.5 3.5 - 5.2 g/dL    Bilirubin Total 2.2 (H) <=1.2 mg/dL   Lipase   Result Value Ref Range    Lipase  1,379 (H) 13 - 60 U/L   INR   Result Value Ref Range    INR 0.96 0.85 - 1.15   [place] Draw    Narrative    The following orders were created for panel order [place] Draw.  Procedure                               Abnormality         Status                     ---------                               -----------         ------                     Extra Red Top Tube[[id number]]                              Final result               Extra Green Top (Lithiu...[[id number]]                      Final result                 Please view results for these tests on the individual orders.   Extra Red Top Tube   Result Value Ref Range    Hold Specimen JIC    Extra Green Top (Lithium Heparin) Tube   Result Value Ref Range    Hold Specimen JIC    Troponin T, High Sensitivity   Result Value Ref Range    Troponin T, High Sensitivity 26 (H) <=22 ng/L   Magnesium   Result Value Ref Range    Magnesium 2.0 1.7 - 2.3 mg/dL   EKG 12-lead, tracing only   Result Value Ref Range    Systolic Blood Pressure  mmHg    Diastolic Blood Pressure  mmHg    Ventricular Rate 67 BPM    Atrial Rate 67 BPM    OK Interval 170 ms    QRS Duration 116 ms     ms    QTc 460 ms    P Axis 63 degrees    R AXIS 44 degrees    T Axis -19 degrees    Interpretation ECG       Sinus rhythm with sinus arrhythmia with occasional Premature ventricular complexes  Minimal voltage criteria for LVH, may be normal variant ( [place] product )  Possible Inferior infarct (cited on or before 12-Oct-2023)  Abnormal ECG  When compared with ECG of 24-Sep-2024 09:08,  Premature ventricular complexes are now Present     UA with Microscopic reflex to Culture    Specimen: Urine, Midstream   Result Value Ref Range    Color Urine Yellow Colorless, Straw, Light Yellow, Yellow    Appearance Urine Clear Clear    Glucose Urine Negative Negative mg/dL    Bilirubin Urine Negative Negative    Ketones Urine Negative Negative mg/dL    Specific Gravity Urine 1.012 1.003 - 1.035    Blood Urine  Negative Negative    pH Urine 7.0 4.7 - 8.0    Protein Albumin Urine Negative Negative mg/dL    Urobilinogen Urine Normal Normal mg/dL    Nitrite Urine Negative Negative    Leukocyte Esterase Urine Negative Negative    RBC Urine <1 <=2 /HPF    WBC Urine 0 <=5 /HPF    Squamous Epithelials Urine 0 <=1 /HPF    Narrative    Urine Culture not indicated   CT Abdomen Pelvis w Contrast   Result Value Ref Range    Radiologist flags Infrarenal abdominal aortic aneurysm.     Narrative    EXAM: CT ABDOMEN PELVIS W CONTRAST  LOCATION: [facility]  DATE: 4/5/2025    INDICATION: Epigastric pain and pancreatitis. Eval for abscess.  COMPARISON: 9/12/2016  TECHNIQUE: CT scan of the abdomen and pelvis was performed following injection of IV contrast. Multiplanar reformats were obtained. Dose reduction techniques were used.  CONTRAST: Isovue 370 67ml    FINDINGS:   LOWER CHEST: No focal airspace disease. Coronary artery stents.    HEPATOBILIARY: Peripheral right hepatic lobe lesion measuring 2.0 cm, indeterminate, but not significantly changed since 2016, benign. Gallbladder distended measuring 4.4 cm in diameter and 11.4 cm in length. Mild pericholecystic edema. Mild intrahepatic   biliary dilatation. The common duct measures 1.3 cm in diameter. The common duct remains dilated to the level of the ampulla.    PANCREAS: Normal. No main pancreatic duct dilatation. No peripancreatic fat stranding or fluid collection.    SPLEEN: Normal.    ADRENAL GLANDS: Nodular thickening of the bilateral adrenal glands, similar to 2016.    KIDNEYS/BLADDER: Bilateral simple renal cysts; no follow-up indicated. No hydronephrosis. Urinary bladder appears normal.    BOWEL: No obstruction or inflammatory change. Appendix not visualized, although no secondary signs of acute appendicitis.    LYMPH NODES: No lymphadenopathy.    VASCULATURE: Infrarenal abdominal aortic aneurysm measuring 3.0 cm, previously 2.5 cm in 2016. Severe atherosclerotic  calcifications.    PELVIC ORGANS: Enlarged prostate.    MUSCULOSKELETAL: Multilevel degenerative changes of the spine.      Impression    IMPRESSION:   1.  Gallbladder distention with mild pericholecystic edema, possibly acute cholecystitis. Consider correlation with ultrasound.    2.  Moderate extrahepatic and mild intrahepatic biliary dilatation. Consider correlation with MRCP.    3.  No CT evidence of pancreatitis. No peripancreatic fluid collection.    4.  Infrarenal abdominal aortic aneurysm measuring 3.0 cm, previously 2.5 cm in 2016.      [Consider Follow Up: Infrarenal abdominal aortic aneurysm.]    This report will be copied to the [facility] to ensure a provider acknowledges the finding.      Troponin T, High Sensitivity   Result Value Ref Range    Troponin T, High Sensitivity 24 (H) <=22 ng/L   US Abdomen Limited    Narrative    EXAM: US ABDOMEN LIMITED  LOCATION: [facility]  DATE: 4/5/2025    INDICATION: Upper abdominal pain, R O biliary obstruction  COMPARISON: Same date CT abdomen/pelvis  TECHNIQUE: Limited abdominal ultrasound.    FINDINGS:    GALLBLADDER: Gallstones in a significantly distended gallbladder. There is diffuse wall thickening with small volume pericholecystic fluid. Unable to evaluate for sonographic Greenberg's sign given recent pain medicine administration.    BILE DUCTS: There is intrahepatic biliary ductal dilation. The common duct measures 10 mm. No definite filling defects are seen in the visualized portions of the common duct.    LIVER: Normal parenchyma with smooth contour. The portal vein is patent with flow in the normal direction.    RIGHT KIDNEY: No hydronephrosis. Bolus appearing cyst measuring up to 2.1 cm, no specific follow-up recommended.    PANCREAS: Mild pancreatic ductal dilation, measuring up to 4 mm.    No drainable ascites.    Infrarenal abdominal aortic aneurysm is noted, better seen on same day CT.      Impression    IMPRESSION:  1.   Findings compatible with biliary obstruction without definite source visualized, could consider MRCP for further evaluation if clinically indicated.  2.  Cholelithiasis in a distended gallbladder with wall edema and adjacent pericholecystic fluid, suspicious for cholecystitis but could also be related to #1. Consider surgical consultation if clinically indicated.           Medications   iopamidol (ISOVUE-370) solution 70 mL (0 mLs Intravenous Hold 4/5/25 2051)   sodium chloride (PF) 0.9% PF flush 50 mL (50 mLs Intravenous $Given 4/5/25 1936)   iopamidol (ISOVUE-370) solution 67 mL (67 mLs Intravenous $Given 4/5/25 1936)   HYDROmorphone (PF) (DILAUDID) injection 0.5 mg (0.5 mg Intravenous $Given 4/5/25 2050)   labetalol (NORMODYNE/TRANDATE) injection 10 mg (10 mg Intravenous $Given 4/5/25 2054)       Assessments & Plan (with Medical Decision Making)     I have reviewed the nursing notes.    I have reviewed the findings, diagnosis, plan and need for follow up with the patient.    Summary:  Patient presents to the ER today for abdominal pain.  Potential diagnosis which have been considered and evaluated include cholecystitis, pancreatitis, appendicitis, bowel obstruction, intussusception, peritonitis, mesenteric ischemia, ureteral stone, UTI, pyelonephritis, as well as others. Many of these have been excluded using the various modalities and assessment as noted on the chart. At the present time, the diagnosis given seems to be the most likely gallstone pancreatitis, cholecystitis, asymptomatic hypertension.  Upon arrival, vitals signs show blood pressure 240/107 with a pulse of 62.  Temperature 96.3  F.  Respirations 20 with oxygenation 97% on room air.  The patient is alert and oriented no distress.  Cardiac and respiratory examination normal.  Upper/epigastric tenderness to palpation with no hernia or mass.  No CVA tenderness.  POCUS gallbladder ultrasound was conducted on arrival showing significantly distended  gallbladder but no wall thickening, no obvious stones, inconclusive of common bile duct dilation.  ECG obtained showing no acute abnormalities.  Lab work obtained showing WBC of 7.3 with hemoglobin 16.2.  Electrolytes and renal functions benign.  Does have a alk phos of 140 with a ALT of 64 but AST is elevated at 130 with a total bilirubin of 2.2.  Lipase is elevated at 1379.  UA negative.  High-sensitivity troponin 26.  2-hour repeat high-sensitivity troponin 24.  INR 0.96.  Patient states that he never started warfarin.  States he is taking 2 aspirin every day.  CT abdomen pelvis of IV contrast conducted showing gallbladder distention with mild pericholecystic edema, possibly acute cholecystitis.  Moderate extrahepatic and mild intrahepatic biliary dilation, no evidence of CT pancreatitis, infrarenal abdominal aortic aneurysm measuring 3 cm which was previously 2.5 cm in 2016.  For this reason radiologic ultrasound conducted showing findings compatible with biliary obstruction without definite source visualized, cholelithiasis and a distended gallbladder with wall edema and adjacent Kenny cholecystic fluid.  Patient deferred any anything for pain at first but eventually did opt for pain control.  Hydromorphone 0.5 mg IV given.  Patient has chronic elevation of blood pressure looking at previous records.  Labetalol 10 mg IV given in the ER with significant improvement of blood pressure to 160 systolic.  Did contact general surgeon Dr. Narayan and discussed.  Recommends transfer to place with ERCP capabilities as patient likely has choledocholithiasis with gallstone pancreatitis that cannot be ruled out.  For this reason discussed case with [facility] Dr. Barajas.  Patient accepted for transfer.        Critical Care Time: None    Impression and plan discussed with patient. Questions answered, concerns addressed, indications for urgent re-evaluation reviewed, and  given. Patient/Parent/Caregiver agree  with treatment plan and have no further questions at this time.      This document was prepared using a combination of typing and voice generated software.  While every attempt was made for accuracy, spelling and grammatical errors may exist.              New Prescriptions    No medications on file       Final diagnoses:   Asymptomatic hypertension   Gallstone pancreatitis   Cholecystitis       4/5/2025   [facility]       Brad Cueva APRN CNP  04/05/25 [id number]

## 2025-04-05 NOTE — ED TRIAGE NOTES
Pt presents with abdominal pain in the upper quadrants since last night. Pt reports he still has a gallbladder and appendix.     Pt reports he tried Pepto bismol yesterday with no relief and has vomited x3.

## 2025-04-06 NOTE — ED NOTES
Patient is being transferred to [facility] via EMS. [facility]s arrives to transport the patient. Hospital equipment was disconnected and EMS equipment was placed. Report was given to crew and care was transferred. Nurse report was given.

## 2025-04-08 LAB
ATRIAL RATE - MUSE: 67 BPM
DIASTOLIC BLOOD PRESSURE - MUSE: NORMAL MMHG
INTERPRETATION ECG - MUSE: NORMAL
P AXIS - MUSE: 63 DEGREES
PR INTERVAL - MUSE: 170 MS
QRS DURATION - MUSE: 116 MS
QT - MUSE: 436 MS
QTC - MUSE: 460 MS
R AXIS - MUSE: 44 DEGREES
SYSTOLIC BLOOD PRESSURE - MUSE: NORMAL MMHG
T AXIS - MUSE: -19 DEGREES
VENTRICULAR RATE- MUSE: 67 BPM

## 2025-04-15 ENCOUNTER — MEDICAL CORRESPONDENCE (OUTPATIENT)
Dept: MRI IMAGING | Facility: HOSPITAL | Age: 85
End: 2025-04-15

## 2025-04-16 ENCOUNTER — HOSPITAL ENCOUNTER (OUTPATIENT)
Dept: MRI IMAGING | Facility: HOSPITAL | Age: 85
Discharge: HOME | End: 2025-04-16
Attending: FAMILY MEDICINE
Payer: MEDICARE

## 2025-04-16 ENCOUNTER — HOSPITAL ENCOUNTER (EMERGENCY)
Facility: HOSPITAL | Age: 85
Discharge: LEFT BEFORE DISCHARGE | End: 2025-04-16
Attending: FAMILY MEDICINE
Payer: MEDICARE

## 2025-04-16 VITALS
BODY MASS INDEX: 20.23 KG/M2 | RESPIRATION RATE: 14 BRPM | SYSTOLIC BLOOD PRESSURE: 194 MMHG | OXYGEN SATURATION: 94 % | TEMPERATURE: 97.2 F | HEART RATE: 60 BPM | WEIGHT: 135 LBS | DIASTOLIC BLOOD PRESSURE: 108 MMHG

## 2025-04-16 DIAGNOSIS — I63.9 ISCHEMIC STROKE (H): ICD-10-CM

## 2025-04-16 DIAGNOSIS — H53.40 VISUAL FIELD CUT: ICD-10-CM

## 2025-04-16 DIAGNOSIS — Z86.73 HISTORY OF CVA (CEREBROVASCULAR ACCIDENT): ICD-10-CM

## 2025-04-16 DIAGNOSIS — H53.462 HOMONYMOUS HEMIANOPSIA, LEFT: ICD-10-CM

## 2025-04-16 DIAGNOSIS — I15.9 SECONDARY HYPERTENSION: ICD-10-CM

## 2025-04-16 PROCEDURE — 70553 MRI BRAIN STEM W/O & W/DYE: CPT

## 2025-04-16 PROCEDURE — 99282 EMERGENCY DEPT VISIT SF MDM: CPT | Mod: 25

## 2025-04-16 PROCEDURE — 255N000002 HC RX 255 OP 636: Performed by: RADIOLOGY

## 2025-04-16 PROCEDURE — 70553 MRI BRAIN STEM W/O & W/DYE: CPT | Mod: 26 | Performed by: RADIOLOGY

## 2025-04-16 PROCEDURE — A9585 GADOBUTROL INJECTION: HCPCS | Performed by: RADIOLOGY

## 2025-04-16 PROCEDURE — 99282 EMERGENCY DEPT VISIT SF MDM: CPT | Performed by: FAMILY MEDICINE

## 2025-04-16 RX ORDER — GADOBUTROL 604.72 MG/ML
2 INJECTION INTRAVENOUS ONCE
Status: COMPLETED | OUTPATIENT
Start: 2025-04-16 | End: 2025-04-16

## 2025-04-16 RX ORDER — CLOPIDOGREL BISULFATE 75 MG/1
75 TABLET ORAL DAILY
COMMUNITY
Start: 2025-04-15

## 2025-04-16 RX ADMIN — GADOBUTROL 2 ML: 604.72 INJECTION INTRAVENOUS at 16:12

## 2025-04-16 ASSESSMENT — ENCOUNTER SYMPTOMS
HEMATOLOGIC/LYMPHATIC NEGATIVE: 1
CONSTITUTIONAL NEGATIVE: 1
MUSCULOSKELETAL NEGATIVE: 1
ENDOCRINE NEGATIVE: 1
PSYCHIATRIC NEGATIVE: 1
ALLERGIC/IMMUNOLOGIC NEGATIVE: 1
RESPIRATORY NEGATIVE: 1
CARDIOVASCULAR NEGATIVE: 1
NEUROLOGICAL NEGATIVE: 1
GASTROINTESTINAL NEGATIVE: 1

## 2025-04-16 ASSESSMENT — COLUMBIA-SUICIDE SEVERITY RATING SCALE - C-SSRS
6. HAVE YOU EVER DONE ANYTHING, STARTED TO DO ANYTHING, OR PREPARED TO DO ANYTHING TO END YOUR LIFE?: NO
1. IN THE PAST MONTH, HAVE YOU WISHED YOU WERE DEAD OR WISHED YOU COULD GO TO SLEEP AND NOT WAKE UP?: NO
2. HAVE YOU ACTUALLY HAD ANY THOUGHTS OF KILLING YOURSELF IN THE PAST MONTH?: NO

## 2025-04-16 ASSESSMENT — ACTIVITIES OF DAILY LIVING (ADL)
ADLS_ACUITY_SCORE: 51
ADLS_ACUITY_SCORE: 51

## 2025-04-16 NOTE — ED TRIAGE NOTES
Pt sent over from MRI. Pt reports he had the scan ordered following a 2 day history of unsteadiness and left sided peripheral vision changes.

## 2025-04-16 NOTE — DISCHARGE INSTRUCTIONS
Please follow-up with your primary care physician as soon as possible to come up with a plan to complete your post stroke follow-up.

## 2025-04-17 ENCOUNTER — MEDICAL CORRESPONDENCE (OUTPATIENT)
Dept: CT IMAGING | Facility: HOSPITAL | Age: 85
End: 2025-04-17

## 2025-04-17 NOTE — ED PROVIDER NOTES
History     Chief Complaint   Patient presents with    Cerebrovascular Accident     HPI  Kem Abdullahi is a 84 year old male who comes in via private car with his wife.  His history is somewhat complex, he was discharged from the hospital a week ago after a cholecystectomy.  The next day he started having neurologic symptoms of left visual field cut more pronounced in the left eye than right.  Unfortunately he did not going right away to get checked out and then eventually was evaluated by [facility] in [place] and was told that he had neurologic deficit and that his eyes were fine but he had a left visual field cut in both eyes.  Primary care physician ordered MRI of the brain for him and it was read as abnormal with evidence of stroke and patient was sent here for further evaluation and disposition planning.    Patient reports no change in the last several days and his vision and no new symptoms.  He denies any chest pain shortness of breath or any focal motor neurologic deficit.    Past history is notable for ASCVD, hyperlipidemia, hypertension status post coronary artery stenting        Allergies:  No Known Allergies    Problem List:    Patient Active Problem List    Diagnosis Date Noted    Paroxysmal atrial fibrillation (H) 09/25/2024     Priority: Medium    Acute systolic heart failure (H) 09/24/2024     Priority: Medium    Cardiovascular symptoms 09/24/2024     Priority: Medium    Acute ischemic stroke (H) 09/24/2024     Priority: Medium    COPD (chronic obstructive pulmonary disease) (H) 09/24/2024     Priority: Medium    ASHD (arteriosclerotic heart disease) 08/30/2021     Priority: Medium     Formatting of this note might be different from the original.    of RCA      Hyperlipidemia 08/30/2021     Priority: Medium        Past Medical History:    Past Medical History:   Diagnosis Date    Coronary artery disease     ED (erectile dysfunction)     Hyperlipidemia     Hypertension     Stented  "coronary artery 2006       Past Surgical History:    Past Surgical History:   Procedure Laterality Date    BACK SURGERY  2009    lower back    COLONOSCOPY      HERNIA REPAIR      PHACOEMULSIFICATION WITH STANDARD INTRAOCULAR LENS IMPLANT Left 5/23/2016    Procedure: PHACOEMULSIFICATION WITH STANDARD INTRAOCULAR LENS IMPLANT;  Surgeon: Bi Wayne MD;  Location: [facility]       Family History:    No family history on file.    Social History:  Marital Status:  Single [1]  Social History     Tobacco Use    Smoking status: Former    Smokeless tobacco: Never   Substance Use Topics    Alcohol use: Yes     Comment: occassionally    Drug use: Not Currently     Comment: Drug use: \"no\"        Medications:    clopidogrel (PLAVIX) 75 MG tablet  albuterol (PROAIR HFA/PROVENTIL HFA/VENTOLIN HFA) 108 (90 Base) MCG/ACT inhaler  aspirin (ASA) 325 MG EC tablet  atorvastatin (LIPITOR) 80 MG tablet  cetirizine (ZYRTEC) 10 MG tablet  levalbuterol (XOPENEX HFA) 45 MCG/ACT inhaler  losartan (COZAAR) 50 MG tablet  nitroGLYcerin (NITROSTAT) 0.4 MG sublingual tablet  sildenafil (VIAGRA) 100 MG tablet  warfarin ANTICOAGULANT (COUMADIN) 5 MG tablet  zinc 50 MG TABS          Review of Systems   Constitutional: Negative.    HENT: Negative.     Eyes:  Positive for visual disturbance.   Respiratory: Negative.     Cardiovascular: Negative.    Gastrointestinal: Negative.    Endocrine: Negative.    Genitourinary: Negative.    Musculoskeletal: Negative.    Skin: Negative.    Allergic/Immunologic: Negative.    Neurological: Negative.    Hematological: Negative.    Psychiatric/Behavioral: Negative.         Physical Exam   BP: (!) 194/107  Pulse: 59  Temp: 97.2  F (36.2  C)  Resp: 18  Weight: 61.2 kg (135 lb)  SpO2: 95 %      Physical Exam    ED Course        Procedures         EKG was not done           Results for orders placed or performed during the hospital encounter of 04/16/25 (from the past 24 hours)   MR Brain w/o & w Contrast    Narrative    " EXAM: MR BRAIN W/O & W CONTRAST, 4/16/2025 4:28 PM    HISTORY: Ischemic stroke (H); Homonymous hemianopsia, left.     ADDITIONAL HISTORY: Symptoms began one week ago after cholecystectomy  surgery.    TECHNIQUE: Multiplanar, multisequence MR imaging of the head obtained  prior to, and after, intravenous contrast administration    MEDS/CONTRAST: Gadavist  6  mL    COMPARISON: CT head, CTA head and neck and U CT head perfusion  9/24/2024; MR brain 9/24/2024     FINDINGS:    There is diffusion restriction restriction of the medial  occipitotemporal region. There is associated cortical enhancement and  gyral swelling with effacement of the overlying sulci and the right  lateral ventricle atrium, temporal and occipital horns. No acute  hydrocephalus. Findings are consistent with subacute right PCA  territory infarct. Small focus of diffusion restriction in the left  cerebellum, also consistent with infarct, likely subacute. No other  area of abnormal diffusion restriction or intracranial enhancement.    There is no midline shift or evidence of acute intracranial  hemorrhage. Scattered white matter foci of T2 hyperintense FLAIR  signal, which is nonspecific, likely related to chronic small vessel  ischemic disease given the patient's age. Right frontal lobe cortical  encephalomalacia, consistent with prior right MCA territory infarct.  Additional scattered unchanged areas of cortical signal abnormality in  the left frontal and right temporo-occipital regions, also likely  chronic infarcts. Normal major intracranial vascular flow voids.  Punctate foci of hypointense signal on susceptibility weighted  sequence in the cerebellum, likely represents chronic microhemorrhage  from small vessel ischemic disease.    No suspicious abnormality of the skull marrow signal. No paranasal  sinus mucosal thickening. Mastoid air cells are clear. Bilateral  pseudophakia.      Impression    IMPRESSION:    Subacute right PCA territory infarct  and small subacute left  cerebellar lacunar type infarct. Based on history, possibly due to  hypotension during recent surgery. Recommend follow-up CTA head and  neck to evaluate for change in extent of vascular disease.    [Result: Subacute right PCA territory infarct; after scan patient sent  to emergency department for further evaluation]    Provider Brad Cueva in the ED discussed results over the phone  with Dr. Lea at 4/16/2025 4:17 PM and verbalized understanding of  the results.     PAMELA LEA MD         SYSTEM ID:  [id number]       Medications - No data to display    Assessments & Plan (with Medical Decision Making)     I have reviewed the nursing notes.    I have reviewed the findings, diagnosis, plan and need for follow up with the patient.           Medical Decision Making  The patient's presentation was of moderate complexity (an undiagnosed new problem with uncertain prognosis).    The patient's evaluation involved:  strong consideration of a test (see separate area of note for details) that was ultimately deferred    The patient's management necessitated moderate risk (patient was advised to be admitted to [facility] for additional stroke neurology workup and care but he declined and left AMA).    I spent approximately 15 minutes discussing risks and benefits of leaving AMA versus transferring to [facility] as recommended ultimately patient and his wife chose to leave AMA.    Discharge Medication List as of 4/16/2025  6:45 PM          Final diagnoses:   History of CVA (cerebrovascular accident)   Visual field cut   Secondary hypertension       4/16/2025   [facility]       Tristin Jose MD  04/16/25 1931     prognosis).    The patient's evaluation involved:  strong consideration of a test (see separate area of note for details) that was ultimately deferred    The patient's management necessitated moderate risk (patient was advised to be admitted to [facility] for additional stroke neurology workup and care but he declined and left AMA).    I spent approximately 15 minutes discussing risks and benefits of leaving AMA versus transferring to [facility] as recommended ultimately patient and his wife chose to leave AMA.    Discharge Medication List as of 4/16/2025  6:45 PM          Final diagnoses:   History of CVA (cerebrovascular accident)   Visual field cut   Secondary hypertension       4/16/2025   [facility]       Tristin Jose MD  04/16/25 1931       Tristin Jose MD  05/06/25 1102

## 2025-04-25 ENCOUNTER — HOSPITAL ENCOUNTER (OUTPATIENT)
Dept: CT IMAGING | Facility: HOSPITAL | Age: 85
Discharge: HOME | End: 2025-04-25
Attending: FAMILY MEDICINE | Admitting: RADIOLOGY
Payer: MEDICARE

## 2025-04-25 DIAGNOSIS — I63.9 ISCHEMIC STROKE (H): ICD-10-CM

## 2025-04-25 PROCEDURE — 70496 CT ANGIOGRAPHY HEAD: CPT | Mod: 26 | Performed by: RADIOLOGY

## 2025-04-25 PROCEDURE — 70498 CT ANGIOGRAPHY NECK: CPT | Mod: 26 | Performed by: RADIOLOGY

## 2025-04-25 PROCEDURE — 250N000011 HC RX IP 250 OP 636: Performed by: RADIOLOGY

## 2025-04-25 PROCEDURE — 70496 CT ANGIOGRAPHY HEAD: CPT

## 2025-04-25 RX ORDER — IOPAMIDOL 755 MG/ML
67 INJECTION, SOLUTION INTRAVASCULAR ONCE
Status: COMPLETED | OUTPATIENT
Start: 2025-04-25 | End: 2025-04-25

## 2025-04-25 RX ADMIN — IOPAMIDOL 67 ML: 755 INJECTION, SOLUTION INTRAVENOUS at 10:48

## 2025-05-18 ENCOUNTER — APPOINTMENT (OUTPATIENT)
Dept: CT IMAGING | Facility: HOSPITAL | Age: 85
End: 2025-05-18
Attending: EMERGENCY MEDICINE
Payer: MEDICARE

## 2025-05-18 ENCOUNTER — APPOINTMENT (OUTPATIENT)
Dept: GENERAL RADIOLOGY | Facility: HOSPITAL | Age: 85
End: 2025-05-18
Attending: EMERGENCY MEDICINE
Payer: MEDICARE

## 2025-05-18 ENCOUNTER — HOSPITAL ENCOUNTER (EMERGENCY)
Facility: HOSPITAL | Age: 85
Discharge: HOME | End: 2025-05-18
Attending: EMERGENCY MEDICINE
Payer: MEDICARE

## 2025-05-18 VITALS
BODY MASS INDEX: 20.46 KG/M2 | HEART RATE: 50 BPM | RESPIRATION RATE: 18 BRPM | WEIGHT: 135 LBS | SYSTOLIC BLOOD PRESSURE: 125 MMHG | TEMPERATURE: 97.5 F | HEIGHT: 68 IN | OXYGEN SATURATION: 92 % | DIASTOLIC BLOOD PRESSURE: 88 MMHG

## 2025-05-18 DIAGNOSIS — I15.9 SECONDARY HYPERTENSION: ICD-10-CM

## 2025-05-18 LAB
ALBUMIN SERPL BCG-MCNC: 4.5 G/DL (ref 3.5–5.2)
ALBUMIN UR-MCNC: NEGATIVE MG/DL
ALP SERPL-CCNC: 119 U/L (ref 40–150)
ALT SERPL W P-5'-P-CCNC: 24 U/L (ref 0–70)
ANION GAP SERPL CALCULATED.3IONS-SCNC: 11 MMOL/L (ref 7–15)
APPEARANCE UR: CLEAR
AST SERPL W P-5'-P-CCNC: 32 U/L (ref 0–45)
BASOPHILS # BLD AUTO: 0 10E3/UL (ref 0–0.2)
BASOPHILS NFR BLD AUTO: 1 %
BILIRUB SERPL-MCNC: 1.2 MG/DL
BILIRUB UR QL STRIP: NEGATIVE
BUN SERPL-MCNC: 12.5 MG/DL (ref 8–23)
CALCIUM SERPL-MCNC: 10 MG/DL (ref 8.8–10.4)
CHLORIDE SERPL-SCNC: 99 MMOL/L (ref 98–107)
COLOR UR AUTO: NORMAL
CREAT SERPL-MCNC: 0.79 MG/DL (ref 0.67–1.17)
EGFRCR SERPLBLD CKD-EPI 2021: 89 ML/MIN/1.73M2
EOSINOPHIL # BLD AUTO: 0.2 10E3/UL (ref 0–0.7)
EOSINOPHIL NFR BLD AUTO: 5 %
ERYTHROCYTE [DISTWIDTH] IN BLOOD BY AUTOMATED COUNT: 14.4 % (ref 10–15)
GLUCOSE SERPL-MCNC: 106 MG/DL (ref 70–99)
GLUCOSE UR STRIP-MCNC: NEGATIVE MG/DL
HCO3 SERPL-SCNC: 29 MMOL/L (ref 22–29)
HCT VFR BLD AUTO: 45.5 % (ref 40–53)
HGB BLD-MCNC: 15 G/DL (ref 13.3–17.7)
HGB UR QL STRIP: NEGATIVE
HOLD SPECIMEN: NORMAL
HOLD SPECIMEN: NORMAL
IMM GRANULOCYTES # BLD: 0 10E3/UL
IMM GRANULOCYTES NFR BLD: 0 %
INR PPP: 1.05 (ref 0.85–1.15)
KETONES UR STRIP-MCNC: NEGATIVE MG/DL
LEUKOCYTE ESTERASE UR QL STRIP: NEGATIVE
LYMPHOCYTES # BLD AUTO: 0.8 10E3/UL (ref 0.8–5.3)
LYMPHOCYTES NFR BLD AUTO: 17 %
MCH RBC QN AUTO: 29.8 PG (ref 26.5–33)
MCHC RBC AUTO-ENTMCNC: 33 G/DL (ref 31.5–36.5)
MCV RBC AUTO: 90 FL (ref 78–100)
MONOCYTES # BLD AUTO: 0.5 10E3/UL (ref 0–1.3)
MONOCYTES NFR BLD AUTO: 11 %
NEUTROPHILS # BLD AUTO: 2.9 10E3/UL (ref 1.6–8.3)
NEUTROPHILS NFR BLD AUTO: 66 %
NITRATE UR QL: NEGATIVE
NRBC # BLD AUTO: 0 10E3/UL
NRBC BLD AUTO-RTO: 0 /100
NT-PROBNP SERPL-MCNC: 1007 PG/ML (ref 0–852)
PH UR STRIP: 7 [PH] (ref 4.7–8)
PLATELET # BLD AUTO: 220 10E3/UL (ref 150–450)
POTASSIUM SERPL-SCNC: 4.3 MMOL/L (ref 3.4–5.3)
PROT SERPL-MCNC: 7.5 G/DL (ref 6.4–8.3)
PROTHROMBIN TIME: 13.9 SECONDS (ref 11.8–14.8)
RBC # BLD AUTO: 5.04 10E6/UL (ref 4.4–5.9)
RBC URINE: 0 /HPF
SODIUM SERPL-SCNC: 139 MMOL/L (ref 135–145)
SP GR UR STRIP: 1.01 (ref 1–1.03)
SQUAMOUS EPITHELIAL: 0 /HPF
UROBILINOGEN UR STRIP-MCNC: NORMAL MG/DL
WBC # BLD AUTO: 4.4 10E3/UL (ref 4–11)
WBC URINE: 0 /HPF

## 2025-05-18 PROCEDURE — 70450 CT HEAD/BRAIN W/O DYE: CPT | Mod: 26 | Performed by: RADIOLOGY

## 2025-05-18 PROCEDURE — 71046 X-RAY EXAM CHEST 2 VIEWS: CPT | Mod: 26 | Performed by: RADIOLOGY

## 2025-05-18 PROCEDURE — 93010 ELECTROCARDIOGRAM REPORT: CPT | Performed by: INTERNAL MEDICINE

## 2025-05-18 PROCEDURE — 85025 COMPLETE CBC W/AUTO DIFF WBC: CPT | Performed by: EMERGENCY MEDICINE

## 2025-05-18 PROCEDURE — 71046 X-RAY EXAM CHEST 2 VIEWS: CPT

## 2025-05-18 PROCEDURE — 70450 CT HEAD/BRAIN W/O DYE: CPT

## 2025-05-18 PROCEDURE — 82565 ASSAY OF CREATININE: CPT | Performed by: EMERGENCY MEDICINE

## 2025-05-18 PROCEDURE — 99291 CRITICAL CARE FIRST HOUR: CPT | Mod: 25

## 2025-05-18 PROCEDURE — 85610 PROTHROMBIN TIME: CPT | Performed by: EMERGENCY MEDICINE

## 2025-05-18 PROCEDURE — 99284 EMERGENCY DEPT VISIT MOD MDM: CPT | Performed by: EMERGENCY MEDICINE

## 2025-05-18 PROCEDURE — 83880 ASSAY OF NATRIURETIC PEPTIDE: CPT | Performed by: EMERGENCY MEDICINE

## 2025-05-18 PROCEDURE — 36415 COLL VENOUS BLD VENIPUNCTURE: CPT | Performed by: EMERGENCY MEDICINE

## 2025-05-18 PROCEDURE — 81003 URINALYSIS AUTO W/O SCOPE: CPT | Performed by: EMERGENCY MEDICINE

## 2025-05-18 PROCEDURE — 250N000013 HC RX MED GY IP 250 OP 250 PS 637: Performed by: EMERGENCY MEDICINE

## 2025-05-18 PROCEDURE — 93005 ELECTROCARDIOGRAM TRACING: CPT

## 2025-05-18 RX ORDER — CLONIDINE HYDROCHLORIDE 0.1 MG/1
TABLET ORAL
Qty: 28 TABLET | Refills: 1 | Status: SHIPPED | OUTPATIENT
Start: 2025-05-18

## 2025-05-18 RX ORDER — CLONIDINE HYDROCHLORIDE 0.1 MG/1
0.1 TABLET ORAL ONCE
Status: COMPLETED | OUTPATIENT
Start: 2025-05-18 | End: 2025-05-18

## 2025-05-18 RX ADMIN — CLONIDINE HYDROCHLORIDE 0.1 MG: 0.1 TABLET ORAL at 10:39

## 2025-05-18 ASSESSMENT — COLUMBIA-SUICIDE SEVERITY RATING SCALE - C-SSRS
2. HAVE YOU ACTUALLY HAD ANY THOUGHTS OF KILLING YOURSELF IN THE PAST MONTH?: NO
1. IN THE PAST MONTH, HAVE YOU WISHED YOU WERE DEAD OR WISHED YOU COULD GO TO SLEEP AND NOT WAKE UP?: NO
6. HAVE YOU EVER DONE ANYTHING, STARTED TO DO ANYTHING, OR PREPARED TO DO ANYTHING TO END YOUR LIFE?: NO

## 2025-05-18 ASSESSMENT — ACTIVITIES OF DAILY LIVING (ADL)
ADLS_ACUITY_SCORE: 51

## 2025-05-18 NOTE — ED NOTES
Pt ambulated from triage to room with a strong, steady, gait, with wife at side. Pt provided urine cup and is in restroom, call light provided, head of bed elevated, pt denied any other needs at this time.

## 2025-05-18 NOTE — ED PROVIDER NOTES
History     Chief Complaint   Patient presents with    Hypertension     HPI  Kem Abdullahi is a 84 year old male who patient has a history of paroxysmal atrial fibrillation COPD who had a cholecystectomy and developed a right posterior cerebral artery occlusion causing ischemic stroke.  He had been doing well and managed with losartan 100 mg daily in the morning and carvedilol 3.125 mg in the morning.  His blood pressure has been fairly well-controlled but then last week during the heat he had blood pressure 100 systolic.  His physician recommended changing the losartan to bedtime and since that time his blood pressures been creeping up.  It was in the 140s to 150s systolic then 160s to 170s systolic yesterday and this morning is greater than 200.  He did take his carvedilol this morning.  He is otherwise asymptomatic he does have some chronic dizziness or unsteady gait from his recent infarct and left hemianopsia.  He has not had any increase shortness of breath peripheral edema headache nausea vomiting or new strokelike symptoms.  He previously was taking Coumadin he is now on Plavix.    Allergies:  No Known Allergies    Problem List:    Patient Active Problem List    Diagnosis Date Noted    Paroxysmal atrial fibrillation (H) 09/25/2024     Priority: Medium    Acute systolic heart failure (H) 09/24/2024     Priority: Medium    Cardiovascular symptoms 09/24/2024     Priority: Medium    Acute ischemic stroke (H) 09/24/2024     Priority: Medium    COPD (chronic obstructive pulmonary disease) (H) 09/24/2024     Priority: Medium    ASHD (arteriosclerotic heart disease) 08/30/2021     Priority: Medium     Formatting of this note might be different from the original.    of RCA      Hyperlipidemia 08/30/2021     Priority: Medium        Past Medical History:    Past Medical History:   Diagnosis Date    Coronary artery disease     ED (erectile dysfunction)     Hyperlipidemia     Hypertension     Stented coronary  "artery 2006       Past Surgical History:    Past Surgical History:   Procedure Laterality Date    BACK SURGERY  2009    lower back    COLONOSCOPY      HERNIA REPAIR      PHACOEMULSIFICATION WITH STANDARD INTRAOCULAR LENS IMPLANT Left 5/23/2016    Procedure: PHACOEMULSIFICATION WITH STANDARD INTRAOCULAR LENS IMPLANT;  Surgeon: Bi Wayne MD;  Location: [facility]       Family History:    No family history on file.    Social History:  Marital Status:  Single [1]  Social History     Tobacco Use    Smoking status: Former    Smokeless tobacco: Never   Substance Use Topics    Alcohol use: Yes     Comment: occassionally    Drug use: Not Currently     Comment: Drug use: \"no\"        Medications:    atorvastatin (LIPITOR) 80 MG tablet  cloNIDine (CATAPRES) 0.1 MG tablet  cloNIDine (CATAPRES) 0.1 MG tablet  clopidogrel (PLAVIX) 75 MG tablet  losartan (COZAAR) 50 MG tablet  nitroGLYcerin (NITROSTAT) 0.4 MG sublingual tablet  zinc 50 MG TABS  albuterol (PROAIR HFA/PROVENTIL HFA/VENTOLIN HFA) 108 (90 Base) MCG/ACT inhaler  aspirin (ASA) 325 MG EC tablet  cetirizine (ZYRTEC) 10 MG tablet  levalbuterol (XOPENEX HFA) 45 MCG/ACT inhaler  sildenafil (VIAGRA) 100 MG tablet  warfarin ANTICOAGULANT (COUMADIN) 5 MG tablet          Review of Systems   All other systems reviewed and are negative.      Physical Exam   BP: (!) 225/106  Pulse: 57  Temp: (!) 96.4  F (35.8  C)  Resp: 16  Height: 172.7 cm (5' 8\")  Weight: 61.2 kg (135 lb)  SpO2: 96 %      Physical Exam  Vitals and nursing note reviewed.   Constitutional:       General: He is not in acute distress.     Appearance: Normal appearance. He is not ill-appearing, toxic-appearing or diaphoretic.   HENT:      Head: Normocephalic and atraumatic.      Right Ear: External ear normal.      Left Ear: External ear normal.      Nose: Nose normal.      Mouth/Throat:      Mouth: Mucous membranes are moist.      Pharynx: Oropharynx is clear.   Eyes:      General: No scleral icterus.     " Extraocular Movements: Extraocular movements intact.      Conjunctiva/sclera: Conjunctivae normal.      Pupils: Pupils are equal, round, and reactive to light.      Comments: Left hemianopsia   Cardiovascular:      Rate and Rhythm: Normal rate and regular rhythm.      Pulses: Normal pulses.      Heart sounds: Normal heart sounds.   Pulmonary:      Effort: Pulmonary effort is normal. No respiratory distress.      Breath sounds: Normal breath sounds.   Abdominal:      General: Abdomen is flat.      Palpations: Abdomen is soft.      Tenderness: There is no abdominal tenderness.   Musculoskeletal:         General: No deformity. Normal range of motion.      Cervical back: Normal range of motion and neck supple.      Right lower leg: No edema.      Left lower leg: No edema.   Skin:     General: Skin is warm and dry.      Capillary Refill: Capillary refill takes less than 2 seconds.   Neurological:      Mental Status: He is alert and oriented to person, place, and time. Mental status is at baseline.      Comments: Mildly positive Romberg no pronator drift cranial nerves II to XII grossly normal other than patient has a left visual field deficit, he has some mild intention tremor on the left upper extremity with finger-to-nose   Psychiatric:         Mood and Affect: Mood normal.         Behavior: Behavior normal.         Thought Content: Thought content normal.         Judgment: Judgment normal.         ED Course     ED Course as of 05/18/25 1220   Sun May 18, 2025   1148 Patient's BNP is elevated he has known chronic congestive heart failure chest x-ray shows no signs of pulmonary edema or CHF   1218 Patient's blood pressures come down to 144/75 he is otherwise remained asymptomatic will prescribe clonidine on a as needed basis follow-up with PCP     Procedures              Critical Care time:  none    None       EKG done at 1017 reviewed at 1017 agree with below interpretation  Results for orders placed or performed  during the hospital encounter of 05/18/25 (from the past 24 hours)   EKG 12-lead, tracing only   Result Value Ref Range    Systolic Blood Pressure  mmHg    Diastolic Blood Pressure  mmHg    Ventricular Rate 57 BPM    Atrial Rate 57 BPM    PA Interval 172 ms    QRS Duration 110 ms     ms    QTc 467 ms    P Axis 61 degrees    R AXIS 56 degrees    T Axis -57 degrees    Interpretation ECG       Sinus bradycardia with Premature atrial complexes  Possible Inferior infarct (cited on or before 12-Oct-2023)  Abnormal ECG  When compared with ECG of 05-Apr-2025 18:50,  Premature ventricular complexes are no longer Present  Premature atrial complexes are now Present     UA with Microscopic reflex to Culture    Specimen: Urine, Clean Catch   Result Value Ref Range    Color Urine Straw Colorless, Straw, Light Yellow, Yellow    Appearance Urine Clear Clear    Glucose Urine Negative Negative mg/dL    Bilirubin Urine Negative Negative    Ketones Urine Negative Negative mg/dL    Specific Gravity Urine 1.006 1.003 - 1.035    Blood Urine Negative Negative    pH Urine 7.0 4.7 - 8.0    Protein Albumin Urine Negative Negative mg/dL    Urobilinogen Urine Normal Normal mg/dL    Nitrite Urine Negative Negative    Leukocyte Esterase Urine Negative Negative    RBC Urine 0 <=2 /HPF    WBC Urine 0 <=5 /HPF    Squamous Epithelials Urine 0 <=1 /HPF    Narrative    Urine Culture not indicated   XR Chest 2 Views    Narrative    EXAM: XR CHEST 2 VIEWS  LOCATION: [facility]  DATE: 05/18/2025    INDICATION: Hypertension.  COMPARISON: 10/12/2023.      Impression    IMPRESSION: Hyperinflation of both lungs suggests COPD. No pneumothorax. No pleural effusions. Tortuous calcified thoracic aorta. Heart size and pulmonary vascularity are within normal limits. Coronary artery stenting.     CT Head w/o Contrast    Narrative    EXAM: CT HEAD WITHOUT CONTRAST  LOCATION: [facility]  DATE: 05/18/2025    INDICATION: Increased  hypertension, recent PCA stroke, neuro exam stable. Rule out ICB.  COMPARISON: CTA of the head and neck dated 04/25/2025 and MRI of the head 04/16/2025.  TECHNIQUE: Routine CT Head without IV contrast. Multiplanar reformats. Dose reduction techniques were used.    FINDINGS:  INTRACRANIAL CONTENTS: Interval expected evolution of a subacute infarct involving the right posterior cerebral artery territory, including involvement of the posterior inferomedial right temporal lobe and the paramedian right occipital lobe, with   overall similar distribution compared to the recent MRI exam. There is an unchanged chronic right frontal lobe infarct. Small focus of hypoattenuation in the anterolateral right thalamus again noted, concerning for possible chronic infarct. No definite   new acute/subacute large transcortical infarct identified. Mild generalized brain parenchymal volume loss. Minimal presumed patchy chronic small vessel ischemic changes in the cerebral white matter. No acute intracranial hemorrhage, extra-axial fluid   collection, or mass effect identified.    VISUALIZED ORBITS/SINUSES/MASTOIDS: Prior bilateral cataract surgery. Visualized portions of the orbits are otherwise unremarkable. Tiny probable retention cyst or polyp left maxillary sinus. No middle ear or mastoid effusion.    BONES/SOFT TISSUES: No acute abnormality.      Impression    IMPRESSION:  1.  No acute intracranial hemorrhage, extra-axial fluid collection, or mass effect.  2.  Expected evolution of a subacute right posterior cerebral artery territory infarct.  3.  Unchanged chronic right frontal lobe infarct.  4.  Brain atrophy and presumed chronic small vessel ischemic changes, as described.     CBC with platelets differential    Narrative    The following orders were created for panel order CBC with platelets differential.  Procedure                               Abnormality         Status                     ---------                                -----------         ------                     CBC with platelets and ...[[id number]]                      Final result                 Please view results for these tests on the individual orders.   Comprehensive metabolic panel   Result Value Ref Range    Sodium 139 135 - 145 mmol/L    Potassium 4.3 3.4 - 5.3 mmol/L    Carbon Dioxide (CO2) 29 22 - 29 mmol/L    Anion Gap 11 7 - 15 mmol/L    Urea Nitrogen 12.5 8.0 - 23.0 mg/dL    Creatinine 0.79 0.67 - 1.17 mg/dL    GFR Estimate 89 >60 mL/min/1.73m2    Calcium 10.0 8.8 - 10.4 mg/dL    Chloride 99 98 - 107 mmol/L    Glucose 106 (H) 70 - 99 mg/dL    Alkaline Phosphatase 119 40 - 150 U/L    AST 32 0 - 45 U/L    ALT 24 0 - 70 U/L    Protein Total 7.5 6.4 - 8.3 g/dL    Albumin 4.5 3.5 - 5.2 g/dL    Bilirubin Total 1.2 <=1.2 mg/dL   NT-proBNP   Result Value Ref Range    NT-proBNP 1,007 (H) 0 - 852 pg/mL   INR   Result Value Ref Range    INR 1.05 0.85 - 1.15    PT 13.9 11.8 - 14.8 Seconds   CBC with platelets and differential   Result Value Ref Range    WBC Count 4.4 4.0 - 11.0 10e3/uL    RBC Count 5.04 4.40 - 5.90 10e6/uL    Hemoglobin 15.0 13.3 - 17.7 g/dL    Hematocrit 45.5 40.0 - 53.0 %    MCV 90 78 - 100 fL    MCH 29.8 26.5 - 33.0 pg    MCHC 33.0 31.5 - 36.5 g/dL    RDW 14.4 10.0 - 15.0 %    Platelet Count 220 150 - 450 10e3/uL    % Neutrophils 66 %    % Lymphocytes 17 %    % Monocytes 11 %    % Eosinophils 5 %    % Basophils 1 %    % Immature Granulocytes 0 %    NRBCs per 100 WBC 0 <1 /100    Absolute Neutrophils 2.9 1.6 - 8.3 10e3/uL    Absolute Lymphocytes 0.8 0.8 - 5.3 10e3/uL    Absolute Monocytes 0.5 0.0 - 1.3 10e3/uL    Absolute Eosinophils 0.2 0.0 - 0.7 10e3/uL    Absolute Basophils 0.0 0.0 - 0.2 10e3/uL    Absolute Immature Granulocytes 0.0 <=0.4 10e3/uL    Absolute NRBCs 0.0 10e3/uL   Extra Tube    Narrative    The following orders were created for panel order Extra Tube.  Procedure                               Abnormality         Status                      ---------                               -----------         ------                     Extra Red Top Tube[[id number]]                              In process                 Extra Heparinized Syringe[[id number]]                       In process                   Please view results for these tests on the individual orders.       Medications   cloNIDine (CATAPRES) tablet 0.1 mg (0.1 mg Oral $Given 5/18/25 1039)       Assessments & Plan (with Medical Decision Making)     I have reviewed the nursing notes.    I have reviewed the findings, diagnosis, plan and need for follow up with the patient.           Medical Decision Making  The patient's presentation was of high complexity (an acute health issue posing potential threat to life or bodily function).    The patient's evaluation involved:  ordering and/or review of 3+ test(s) in this encounter (EKG chest x-ray CT scan multiple lab investigations)    The patient's management necessitated moderate risk (prescription drug management including medications given in the ED).        New Prescriptions    CLONIDINE (CATAPRES) 0.1 MG TABLET    Take 1 tablet (0.1 mg) by mouth twice daily.  Increase to 2 tablets (0.2 mg) twice daily if blood pressure not below 140/90.    CLONIDINE (CATAPRES) 0.1 MG TABLET    Take 1 tablet (0.1 mg) by mouth twice daily.  Increase to 2 tablets (0.2 mg) twice daily if blood pressure not below 140/90.     The patient is a 84 year old year old male with a past medical history as above of who presents to the ED with a complaint of elevated blood pressure.  History was obtained from the patient and his wife.  Presentation combined with history increase my concerns for intracranial bleed post ischemic stroke, expansion of ischemic stroke, acute renal failure, congestive heart failure exacerbation.  It was decided necessary to order ED investigations in order to properly assess patient's acute emergent complaint.  My independent interpretation of  revealed and is in agreement with radiology interpretation showing chest x-ray showed no evidence of CHF, CT head was noncontrast.  My independent review of EKG reveals see above.  ED labs reveal BNP was thousand no old ones to compare chest x-ray was negative for any CHF and he had no peripheral edema suspect he chronically has elevated BNP from his chronic CHF, renal function CBC chemistry profile otherwise unremarkable, urinalysis negative.  After prolonged ED observation interpretation of vital signs history physical ED studies feel the patient has hypertension exacerbation that was idiopathic.  Shared decision-making was discussed in layman terms with the patient or caregiver and they agree with plan.  Patient had excellent response to clonidine his blood pressure was greater than 200 systolic in the 120 diastolic range came down to 144/75 in the ED and he remained asymptomatic, clonidine on a as needed basis prescribed until can be seen by PCP for further management return to ED if any concerns, strong return precautions were given.    Final diagnoses:   Secondary hypertension       5/18/2025   [facility]       Duarte Cosby MD  05/18/25 [id number]

## 2025-05-18 NOTE — ED TRIAGE NOTES
Pt comes in with high blood pressure,pt had blood pressure medication changed to every evening 3 days ago due to a low BP at home. Pt denies any other symptoms. Pt has a hx of a stroke in April this year after a surgery. Pt denies pain.

## 2025-05-19 LAB
ATRIAL RATE - MUSE: 57 BPM
DIASTOLIC BLOOD PRESSURE - MUSE: NORMAL MMHG
INTERPRETATION ECG - MUSE: NORMAL
P AXIS - MUSE: 61 DEGREES
PR INTERVAL - MUSE: 172 MS
QRS DURATION - MUSE: 110 MS
QT - MUSE: 480 MS
QTC - MUSE: 467 MS
R AXIS - MUSE: 56 DEGREES
SYSTOLIC BLOOD PRESSURE - MUSE: NORMAL MMHG
T AXIS - MUSE: -57 DEGREES
VENTRICULAR RATE- MUSE: 57 BPM

## (undated) RX ORDER — IPRATROPIUM BROMIDE AND ALBUTEROL SULFATE 2.5; .5 MG/3ML; MG/3ML
SOLUTION RESPIRATORY (INHALATION)
Status: DISPENSED
Start: 2023-10-12

## (undated) RX ORDER — PREDNISONE 20 MG/1
TABLET ORAL
Status: DISPENSED
Start: 2023-10-12